# Patient Record
Sex: MALE | Race: WHITE | NOT HISPANIC OR LATINO | Employment: PART TIME | URBAN - METROPOLITAN AREA
[De-identification: names, ages, dates, MRNs, and addresses within clinical notes are randomized per-mention and may not be internally consistent; named-entity substitution may affect disease eponyms.]

---

## 2023-12-03 DIAGNOSIS — E11.59 HYPERTENSION ASSOCIATED WITH TYPE 2 DIABETES MELLITUS (MULTI): ICD-10-CM

## 2023-12-03 DIAGNOSIS — I15.2 HYPERTENSION ASSOCIATED WITH TYPE 2 DIABETES MELLITUS (MULTI): ICD-10-CM

## 2023-12-04 ENCOUNTER — LAB (OUTPATIENT)
Dept: LAB | Facility: LAB | Age: 53
End: 2023-12-04
Payer: MEDICARE

## 2023-12-04 DIAGNOSIS — I25.10 ATHEROSCLEROTIC HEART DISEASE OF NATIVE CORONARY ARTERY WITHOUT ANGINA PECTORIS: ICD-10-CM

## 2023-12-04 DIAGNOSIS — E11.69 TYPE 2 DIABETES MELLITUS WITH OTHER SPECIFIED COMPLICATION (MULTI): Primary | ICD-10-CM

## 2023-12-04 LAB
ALBUMIN SERPL BCP-MCNC: 4.4 G/DL (ref 3.4–5)
ALP SERPL-CCNC: 55 U/L (ref 33–120)
ALT SERPL W P-5'-P-CCNC: 14 U/L (ref 10–52)
ANION GAP SERPL CALC-SCNC: 9 MMOL/L (ref 10–20)
AST SERPL W P-5'-P-CCNC: 12 U/L (ref 9–39)
BILIRUB SERPL-MCNC: 0.4 MG/DL (ref 0–1.2)
BUN SERPL-MCNC: 13 MG/DL (ref 6–23)
CALCIUM SERPL-MCNC: 9.5 MG/DL (ref 8.6–10.3)
CHLORIDE SERPL-SCNC: 102 MMOL/L (ref 98–107)
CHOLEST SERPL-MCNC: 264 MG/DL (ref 0–199)
CHOLESTEROL/HDL RATIO: 5.6
CO2 SERPL-SCNC: 32 MMOL/L (ref 21–32)
CREAT SERPL-MCNC: 0.77 MG/DL (ref 0.5–1.3)
GFR SERPL CREATININE-BSD FRML MDRD: >90 ML/MIN/1.73M*2
GLUCOSE SERPL-MCNC: 91 MG/DL (ref 74–99)
HDLC SERPL-MCNC: 47 MG/DL
LDLC SERPL CALC-MCNC: 191 MG/DL
NON HDL CHOLESTEROL: 217 MG/DL (ref 0–149)
POTASSIUM SERPL-SCNC: 4.1 MMOL/L (ref 3.5–5.3)
PROT SERPL-MCNC: 6.9 G/DL (ref 6.4–8.2)
SODIUM SERPL-SCNC: 139 MMOL/L (ref 136–145)
TRIGL SERPL-MCNC: 129 MG/DL (ref 0–149)
VLDL: 26 MG/DL (ref 0–40)

## 2023-12-04 PROCEDURE — 36415 COLL VENOUS BLD VENIPUNCTURE: CPT

## 2023-12-04 PROCEDURE — 80061 LIPID PANEL: CPT

## 2023-12-04 PROCEDURE — 80053 COMPREHEN METABOLIC PANEL: CPT

## 2023-12-05 PROBLEM — S62.303B: Status: ACTIVE | Noted: 2019-04-12

## 2023-12-05 PROBLEM — F17.200 TOBACCO USE DISORDER: Status: ACTIVE | Noted: 2023-12-05

## 2023-12-05 PROBLEM — S62.91XA: Status: ACTIVE | Noted: 2019-04-07

## 2023-12-05 PROBLEM — L30.9 ECZEMA: Status: ACTIVE | Noted: 2023-12-05
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
## 2023-12-05 PROBLEM — E78.5 HYPERLIPIDEMIA: Status: ACTIVE | Noted: 2023-12-05

## 2023-12-05 PROBLEM — S06.0XAA CONCUSSION: Status: ACTIVE | Noted: 2019-04-10

## 2023-12-05 PROBLEM — S42.252D DISPLACED FRACTURE OF GREATER TUBEROSITY OF LEFT HUMERUS WITH ROUTINE HEALING: Status: ACTIVE | Noted: 2019-05-08

## 2023-12-05 PROBLEM — E11.69 HYPERLIPIDEMIA DUE TO TYPE 2 DIABETES MELLITUS (MULTI): Status: ACTIVE | Noted: 2023-12-05

## 2023-12-05 PROBLEM — F17.200 CURRENT EVERY DAY SMOKER: Status: ACTIVE | Noted: 2023-12-05

## 2023-12-05 PROBLEM — Z91.199 NONCOMPLIANCE: Status: ACTIVE | Noted: 2023-12-05

## 2023-12-05 PROBLEM — E78.00 PURE HYPERCHOLESTEROLEMIA: Status: ACTIVE | Noted: 2023-12-05

## 2023-12-05 PROBLEM — U07.1 DISEASE DUE TO SEVERE ACUTE RESPIRATORY SYNDROME CORONAVIRUS 2 (SARS-COV-2): Status: ACTIVE | Noted: 2023-12-05

## 2023-12-05 PROBLEM — E78.5 HYPERLIPIDEMIA DUE TO TYPE 2 DIABETES MELLITUS (MULTI): Status: ACTIVE | Noted: 2023-12-05

## 2023-12-05 PROBLEM — S62.309D: Status: ACTIVE | Noted: 2019-05-08

## 2023-12-05 PROBLEM — E11.9 DIABETES MELLITUS, TYPE II (MULTI): Status: ACTIVE | Noted: 2023-12-05

## 2023-12-05 PROBLEM — R06.02 SOB (SHORTNESS OF BREATH) ON EXERTION: Status: ACTIVE | Noted: 2023-12-05

## 2023-12-05 PROBLEM — F10.10 ALCOHOL CONSUMPTION BINGE DRINKING: Status: ACTIVE | Noted: 2023-12-05

## 2023-12-05 PROBLEM — T07.XXXA MULTIPLE INJURIES DUE TO TRAUMA: Status: ACTIVE | Noted: 2019-04-07

## 2023-12-05 PROBLEM — L25.9 CONTACT DERMATITIS: Status: ACTIVE | Noted: 2023-12-05

## 2023-12-05 PROBLEM — R07.89 CHEST PRESSURE: Status: ACTIVE | Noted: 2023-12-05

## 2023-12-05 PROBLEM — S52.502A CLOSED FRACTURE OF LEFT DISTAL RADIUS AND ULNA: Status: ACTIVE | Noted: 2023-12-05

## 2023-12-05 PROBLEM — S82.202A CLOSED FRACTURE OF LEFT FIBULA AND TIBIA: Status: ACTIVE | Noted: 2019-04-07

## 2023-12-05 PROBLEM — I25.10 CORONARY ARTERY DISEASE INVOLVING NATIVE CORONARY ARTERY OF NATIVE HEART WITHOUT ANGINA PECTORIS: Status: ACTIVE | Noted: 2023-12-05

## 2023-12-05 PROBLEM — E87.6 HYPOKALEMIA: Status: ACTIVE | Noted: 2019-04-11

## 2023-12-05 PROBLEM — K21.9 GERD (GASTROESOPHAGEAL REFLUX DISEASE): Status: ACTIVE | Noted: 2019-04-10

## 2023-12-05 PROBLEM — G47.30 SLEEP APNEA: Status: ACTIVE | Noted: 2023-12-05

## 2023-12-05 PROBLEM — S52.602A CLOSED FRACTURE OF LEFT DISTAL RADIUS AND ULNA: Status: ACTIVE | Noted: 2023-12-05

## 2023-12-05 PROBLEM — R94.39 ABNORMAL STRESS TEST: Status: ACTIVE | Noted: 2023-12-05

## 2023-12-05 PROBLEM — I15.2 HYPERTENSION ASSOCIATED WITH TYPE 2 DIABETES MELLITUS (MULTI): Status: ACTIVE | Noted: 2023-12-05

## 2023-12-05 PROBLEM — E11.59 HYPERTENSION ASSOCIATED WITH TYPE 2 DIABETES MELLITUS (MULTI): Status: ACTIVE | Noted: 2023-12-05

## 2023-12-05 PROBLEM — S82.402A CLOSED FRACTURE OF LEFT FIBULA AND TIBIA: Status: ACTIVE | Noted: 2019-04-07

## 2023-12-05 RX ORDER — ESOMEPRAZOLE MAGNESIUM 20 MG/1
2 TABLET, DELAYED RELEASE ORAL DAILY
COMMUNITY

## 2023-12-05 RX ORDER — AMLODIPINE BESYLATE 5 MG/1
5 TABLET ORAL DAILY
Qty: 90 TABLET | Refills: 3 | Status: SHIPPED | OUTPATIENT
Start: 2023-12-05 | End: 2023-12-07 | Stop reason: SDUPTHER

## 2023-12-05 RX ORDER — LISINOPRIL 10 MG/1
10 TABLET ORAL DAILY
COMMUNITY

## 2023-12-05 RX ORDER — ASPIRIN 81 MG/1
81 TABLET ORAL DAILY
COMMUNITY

## 2023-12-05 RX ORDER — NITROGLYCERIN 0.4 MG/1
0.4 TABLET SUBLINGUAL EVERY 5 MIN PRN
COMMUNITY
End: 2024-03-04 | Stop reason: SDUPTHER

## 2023-12-05 RX ORDER — AMLODIPINE BESYLATE 5 MG/1
5 TABLET ORAL DAILY
COMMUNITY
End: 2023-12-07 | Stop reason: WASHOUT

## 2023-12-07 ENCOUNTER — OFFICE VISIT (OUTPATIENT)
Dept: CARDIOLOGY | Facility: CLINIC | Age: 53
End: 2023-12-07
Payer: MEDICARE

## 2023-12-07 VITALS
HEIGHT: 64 IN | DIASTOLIC BLOOD PRESSURE: 78 MMHG | WEIGHT: 152 LBS | SYSTOLIC BLOOD PRESSURE: 133 MMHG | BODY MASS INDEX: 25.95 KG/M2 | HEART RATE: 63 BPM

## 2023-12-07 DIAGNOSIS — E11.9 TYPE 2 DIABETES MELLITUS WITHOUT COMPLICATION, WITHOUT LONG-TERM CURRENT USE OF INSULIN (MULTI): ICD-10-CM

## 2023-12-07 DIAGNOSIS — E11.59 HYPERTENSION ASSOCIATED WITH TYPE 2 DIABETES MELLITUS (MULTI): ICD-10-CM

## 2023-12-07 DIAGNOSIS — E11.69 HYPERLIPIDEMIA DUE TO TYPE 2 DIABETES MELLITUS (MULTI): ICD-10-CM

## 2023-12-07 DIAGNOSIS — E78.5 HYPERLIPIDEMIA DUE TO TYPE 2 DIABETES MELLITUS (MULTI): ICD-10-CM

## 2023-12-07 DIAGNOSIS — I25.10 CORONARY ARTERY DISEASE INVOLVING NATIVE CORONARY ARTERY OF NATIVE HEART WITHOUT ANGINA PECTORIS: ICD-10-CM

## 2023-12-07 DIAGNOSIS — F17.200 CURRENT EVERY DAY SMOKER: ICD-10-CM

## 2023-12-07 DIAGNOSIS — I15.2 HYPERTENSION ASSOCIATED WITH TYPE 2 DIABETES MELLITUS (MULTI): ICD-10-CM

## 2023-12-07 DIAGNOSIS — G47.30 SLEEP APNEA, UNSPECIFIED TYPE: ICD-10-CM

## 2023-12-07 DIAGNOSIS — R94.39 ABNORMAL STRESS TEST: ICD-10-CM

## 2023-12-07 DIAGNOSIS — R07.9 CHEST PAIN, UNSPECIFIED TYPE: Primary | ICD-10-CM

## 2023-12-07 PROCEDURE — 99214 OFFICE O/P EST MOD 30 MIN: CPT | Performed by: INTERNAL MEDICINE

## 2023-12-07 PROCEDURE — 3078F DIAST BP <80 MM HG: CPT | Performed by: INTERNAL MEDICINE

## 2023-12-07 PROCEDURE — 4010F ACE/ARB THERAPY RXD/TAKEN: CPT | Performed by: INTERNAL MEDICINE

## 2023-12-07 PROCEDURE — 3075F SYST BP GE 130 - 139MM HG: CPT | Performed by: INTERNAL MEDICINE

## 2023-12-07 PROCEDURE — 3050F LDL-C >= 130 MG/DL: CPT | Performed by: INTERNAL MEDICINE

## 2023-12-07 NOTE — PATIENT INSTRUCTIONS
CARDIAC CT ORDERED, TAKE ATENOLOL 100 MG 2 HOURS PRIOR, OBTAIN LAB WORK PRIOR    START ZETIA 10 MG DAILY  INCREASE AMLODIPINE TO 5 MG TWICE DAILY    WILL ORDER LEQVIO    FOLLOW UP AFTER CT SCAN
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
## 2023-12-08 RX ORDER — ATENOLOL 100 MG/1
TABLET ORAL
Qty: 1 TABLET | Refills: 0 | Status: SHIPPED | OUTPATIENT
Start: 2023-12-08 | End: 2024-01-23 | Stop reason: WASHOUT

## 2023-12-08 RX ORDER — EZETIMIBE 10 MG/1
10 TABLET ORAL DAILY
Qty: 90 TABLET | Refills: 3 | Status: SHIPPED | OUTPATIENT
Start: 2023-12-08 | End: 2024-12-07

## 2023-12-08 RX ORDER — AMLODIPINE BESYLATE 5 MG/1
5 TABLET ORAL 2 TIMES DAILY
Qty: 180 TABLET | Refills: 3 | Status: SHIPPED | OUTPATIENT
Start: 2023-12-08 | End: 2024-12-07

## 2023-12-09 DIAGNOSIS — R07.9 CHEST PAIN, UNSPECIFIED TYPE: ICD-10-CM

## 2023-12-12 RX ORDER — ATENOLOL 100 MG/1
TABLET ORAL
Qty: 1 TABLET | Refills: 0 | OUTPATIENT
Start: 2023-12-12

## 2023-12-12 NOTE — TELEPHONE ENCOUNTER
SCHEDULED CCTA AT [facility] ON 1/5/24 AT 9:25AM ARRIVAL TIME, I LEFT VOICEMAIL WITH APPT INFO & SCHEDULING#

## 2024-01-05 ENCOUNTER — ANCILLARY PROCEDURE (OUTPATIENT)
Dept: RADIOLOGY | Facility: CLINIC | Age: 54
End: 2024-01-05
Payer: MEDICARE

## 2024-01-05 VITALS
BODY MASS INDEX: 25.93 KG/M2 | HEIGHT: 64 IN | DIASTOLIC BLOOD PRESSURE: 67 MMHG | RESPIRATION RATE: 16 BRPM | SYSTOLIC BLOOD PRESSURE: 105 MMHG | HEART RATE: 61 BPM | OXYGEN SATURATION: 94 % | WEIGHT: 151.9 LBS

## 2024-01-05 DIAGNOSIS — R94.39 ABNORMAL STRESS TEST: ICD-10-CM

## 2024-01-05 DIAGNOSIS — R93.1 ABNORMAL FINDINGS ON DIAGNOSTIC IMAGING OF HEART AND CORONARY CIRCULATION: ICD-10-CM

## 2024-01-05 DIAGNOSIS — R07.9 CHEST PAIN, UNSPECIFIED TYPE: ICD-10-CM

## 2024-01-05 PROCEDURE — 2500000001 HC RX 250 WO HCPCS SELF ADMINISTERED DRUGS (ALT 637 FOR MEDICARE OP): Performed by: INTERNAL MEDICINE

## 2024-01-05 PROCEDURE — 75580 N-INVAS EST C FFR SW ALY CTA: CPT

## 2024-01-05 PROCEDURE — 75574 CT ANGIO HRT W/3D IMAGE: CPT

## 2024-01-05 PROCEDURE — 75574 CT ANGIO HRT W/3D IMAGE: CPT | Performed by: INTERNAL MEDICINE

## 2024-01-05 PROCEDURE — 2550000001 HC RX 255 CONTRASTS: Performed by: INTERNAL MEDICINE

## 2024-01-05 PROCEDURE — 75580 N-INVAS EST C FFR SW ALY CTA: CPT | Performed by: INTERNAL MEDICINE

## 2024-01-05 RX ORDER — NITROGLYCERIN 400 UG/1
2 SPRAY ORAL ONCE
Status: COMPLETED | OUTPATIENT
Start: 2024-01-05 | End: 2024-01-05

## 2024-01-05 RX ADMIN — IOHEXOL 80 ML: 350 INJECTION, SOLUTION INTRAVENOUS at 10:37

## 2024-01-05 RX ADMIN — NITROGLYCERIN 2 SPRAY: 400 SPRAY ORAL at 09:59

## 2024-01-05 NOTE — NURSING NOTE
Post CCTA pt denies feeling dizzy or lightheaded, denies headache. Steady on feet, skin warm pink and dry. Pt verbalized understanding of increased water intake x24 hours, educated on side effects of medications. HL removed with tip intact, manual pressure held until hemostasis achieved, 2x2 and coban to site. Pt DC home to self care with wife.

## 2024-01-18 ENCOUNTER — TELEPHONE (OUTPATIENT)
Dept: CARDIOLOGY | Facility: CLINIC | Age: 54
End: 2024-01-18
Payer: MEDICARE

## 2024-01-18 NOTE — TELEPHONE ENCOUNTER
Per Dr. Arauz, DO he wants to see the patient in the office to review results.     Will forward to secretaries.

## 2024-01-18 NOTE — TELEPHONE ENCOUNTER
----- Message from Leland Arauz DO sent at 1/8/2024  8:58 AM EST -----  Abnormal. Need to see him this week to discuss cardiac cath  ----- Message -----  From: Interface, Radiology Results In  Sent: 1/5/2024   5:51 PM EST  To: Leland Arauz DO

## 2024-01-18 NOTE — TELEPHONE ENCOUNTER
Patient completed CCTA on 1/5/24.   Abnormal finding.     Per Dr. Arauz, DO patient needs OV to discuss LHC and abnormal CCTA.   Will route to secretaries.

## 2024-01-30 ENCOUNTER — TELEPHONE (OUTPATIENT)
Dept: CARDIOLOGY | Facility: CLINIC | Age: 54
End: 2024-01-30
Payer: MEDICARE

## 2024-01-30 NOTE — TELEPHONE ENCOUNTER
Left voicemail advising patient that we canceled his appointment on 2/14 and rescheduled him for 2/22 at 9am. Mailed appointment reminder.

## 2024-02-14 ENCOUNTER — APPOINTMENT (OUTPATIENT)
Dept: CARDIOLOGY | Facility: CLINIC | Age: 54
End: 2024-02-14
Payer: MEDICARE

## 2024-02-22 ENCOUNTER — LAB (OUTPATIENT)
Dept: LAB | Facility: LAB | Age: 54
End: 2024-02-22
Payer: MEDICARE

## 2024-02-22 ENCOUNTER — OFFICE VISIT (OUTPATIENT)
Dept: CARDIOLOGY | Facility: CLINIC | Age: 54
End: 2024-02-22
Payer: MEDICARE

## 2024-02-22 VITALS
HEIGHT: 64 IN | HEART RATE: 71 BPM | BODY MASS INDEX: 27.4 KG/M2 | WEIGHT: 160.5 LBS | SYSTOLIC BLOOD PRESSURE: 122 MMHG | DIASTOLIC BLOOD PRESSURE: 76 MMHG

## 2024-02-22 DIAGNOSIS — Z01.810 PRE-PROCEDURAL CARDIOVASCULAR EXAMINATION: ICD-10-CM

## 2024-02-22 DIAGNOSIS — E11.59 HYPERTENSION ASSOCIATED WITH TYPE 2 DIABETES MELLITUS (MULTI): ICD-10-CM

## 2024-02-22 DIAGNOSIS — R93.1 ABNORMAL CARDIAC CT ANGIOGRAPHY: ICD-10-CM

## 2024-02-22 DIAGNOSIS — R07.9 CHEST PAIN, UNSPECIFIED TYPE: ICD-10-CM

## 2024-02-22 DIAGNOSIS — I15.2 HYPERTENSION ASSOCIATED WITH TYPE 2 DIABETES MELLITUS (MULTI): ICD-10-CM

## 2024-02-22 LAB
ANION GAP SERPL CALC-SCNC: 13 MMOL/L (ref 10–20)
BUN SERPL-MCNC: 14 MG/DL (ref 6–23)
CALCIUM SERPL-MCNC: 10 MG/DL (ref 8.6–10.3)
CHLORIDE SERPL-SCNC: 101 MMOL/L (ref 98–107)
CO2 SERPL-SCNC: 30 MMOL/L (ref 21–32)
CREAT SERPL-MCNC: 0.84 MG/DL (ref 0.5–1.3)
EGFRCR SERPLBLD CKD-EPI 2021: >90 ML/MIN/1.73M*2
ERYTHROCYTE [DISTWIDTH] IN BLOOD BY AUTOMATED COUNT: 12.5 % (ref 11.5–14.5)
GLUCOSE SERPL-MCNC: 156 MG/DL (ref 74–99)
HCT VFR BLD AUTO: 49 % (ref 41–52)
HGB BLD-MCNC: 16.6 G/DL (ref 13.5–17.5)
MCH RBC QN AUTO: 30.3 PG (ref 26–34)
MCHC RBC AUTO-ENTMCNC: 33.9 G/DL (ref 32–36)
MCV RBC AUTO: 90 FL (ref 80–100)
NRBC BLD-RTO: 0 /100 WBCS (ref 0–0)
PLATELET # BLD AUTO: 200 X10*3/UL (ref 150–450)
POTASSIUM SERPL-SCNC: 4.8 MMOL/L (ref 3.5–5.3)
RBC # BLD AUTO: 5.47 X10*6/UL (ref 4.5–5.9)
SODIUM SERPL-SCNC: 139 MMOL/L (ref 136–145)
WBC # BLD AUTO: 8.8 X10*3/UL (ref 4.4–11.3)

## 2024-02-22 PROCEDURE — 80048 BASIC METABOLIC PNL TOTAL CA: CPT

## 2024-02-22 PROCEDURE — 3078F DIAST BP <80 MM HG: CPT | Performed by: INTERNAL MEDICINE

## 2024-02-22 PROCEDURE — 36415 COLL VENOUS BLD VENIPUNCTURE: CPT

## 2024-02-22 PROCEDURE — 99215 OFFICE O/P EST HI 40 MIN: CPT | Performed by: INTERNAL MEDICINE

## 2024-02-22 PROCEDURE — 85027 COMPLETE CBC AUTOMATED: CPT

## 2024-02-22 PROCEDURE — 3074F SYST BP LT 130 MM HG: CPT | Performed by: INTERNAL MEDICINE

## 2024-02-22 PROCEDURE — 4010F ACE/ARB THERAPY RXD/TAKEN: CPT | Performed by: INTERNAL MEDICINE

## 2024-02-22 RX ORDER — METOPROLOL SUCCINATE 25 MG/1
25 TABLET, EXTENDED RELEASE ORAL DAILY
Qty: 90 TABLET | Refills: 3 | Status: SHIPPED | OUTPATIENT
Start: 2024-02-22 | End: 2025-02-21

## 2024-02-22 RX ORDER — ASPIRIN 325 MG
325 TABLET ORAL ONCE
Status: CANCELLED | OUTPATIENT
Start: 2024-02-22 | End: 2024-02-22
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
## 2024-02-23 ENCOUNTER — HOSPITAL ENCOUNTER (OUTPATIENT)
Facility: HOSPITAL | Age: 54
Setting detail: OUTPATIENT SURGERY
Discharge: HOME | End: 2024-02-23
Attending: INTERNAL MEDICINE | Admitting: INTERNAL MEDICINE
Payer: MEDICARE

## 2024-02-23 ENCOUNTER — APPOINTMENT (OUTPATIENT)
Dept: CARDIOLOGY | Facility: HOSPITAL | Age: 54
End: 2024-02-23
Payer: MEDICARE

## 2024-02-23 VITALS
RESPIRATION RATE: 18 BRPM | DIASTOLIC BLOOD PRESSURE: 60 MMHG | HEART RATE: 68 BPM | SYSTOLIC BLOOD PRESSURE: 118 MMHG | OXYGEN SATURATION: 95 % | TEMPERATURE: 36.1 F

## 2024-02-23 DIAGNOSIS — R93.1 ABNORMAL CARDIAC CT ANGIOGRAPHY: Primary | ICD-10-CM

## 2024-02-23 PROCEDURE — 93458 L HRT ARTERY/VENTRICLE ANGIO: CPT | Performed by: INTERNAL MEDICINE

## 2024-02-23 PROCEDURE — 99152 MOD SED SAME PHYS/QHP 5/>YRS: CPT | Performed by: INTERNAL MEDICINE

## 2024-02-23 PROCEDURE — 2720000007 HC OR 272 NO HCPCS: Performed by: INTERNAL MEDICINE

## 2024-02-23 PROCEDURE — 7100000010 HC PHASE TWO TIME - EACH INCREMENTAL 1 MINUTE: Performed by: INTERNAL MEDICINE

## 2024-02-23 PROCEDURE — 2550000001 HC RX 255 CONTRASTS: Performed by: INTERNAL MEDICINE

## 2024-02-23 PROCEDURE — 93010 ELECTROCARDIOGRAM REPORT: CPT | Performed by: INTERNAL MEDICINE

## 2024-02-23 PROCEDURE — 2500000005 HC RX 250 GENERAL PHARMACY W/O HCPCS: Performed by: INTERNAL MEDICINE

## 2024-02-23 PROCEDURE — 2500000001 HC RX 250 WO HCPCS SELF ADMINISTERED DRUGS (ALT 637 FOR MEDICARE OP): Performed by: INTERNAL MEDICINE

## 2024-02-23 PROCEDURE — 7100000009 HC PHASE TWO TIME - INITIAL BASE CHARGE: Performed by: INTERNAL MEDICINE

## 2024-02-23 PROCEDURE — 2500000004 HC RX 250 GENERAL PHARMACY W/ HCPCS (ALT 636 FOR OP/ED): Performed by: INTERNAL MEDICINE

## 2024-02-23 PROCEDURE — C1894 INTRO/SHEATH, NON-LASER: HCPCS | Performed by: INTERNAL MEDICINE

## 2024-02-23 PROCEDURE — 2500000004 HC RX 250 GENERAL PHARMACY W/ HCPCS (ALT 636 FOR OP/ED): Performed by: NURSE PRACTITIONER

## 2024-02-23 PROCEDURE — 2500000001 HC RX 250 WO HCPCS SELF ADMINISTERED DRUGS (ALT 637 FOR MEDICARE OP): Performed by: NURSE PRACTITIONER

## 2024-02-23 PROCEDURE — 93005 ELECTROCARDIOGRAM TRACING: CPT

## 2024-02-23 PROCEDURE — C1887 CATHETER, GUIDING: HCPCS | Performed by: INTERNAL MEDICINE

## 2024-02-23 RX ORDER — MIDAZOLAM HYDROCHLORIDE 1 MG/ML
INJECTION INTRAMUSCULAR; INTRAVENOUS AS NEEDED
Status: DISCONTINUED | OUTPATIENT
Start: 2024-02-23 | End: 2024-02-23 | Stop reason: HOSPADM

## 2024-02-23 RX ORDER — HEPARIN SODIUM 1000 [USP'U]/ML
INJECTION, SOLUTION INTRAVENOUS; SUBCUTANEOUS AS NEEDED
Status: DISCONTINUED | OUTPATIENT
Start: 2024-02-23 | End: 2024-02-23 | Stop reason: HOSPADM

## 2024-02-23 RX ORDER — SODIUM CHLORIDE 9 MG/ML
100 INJECTION, SOLUTION INTRAVENOUS CONTINUOUS
Status: DISCONTINUED | OUTPATIENT
Start: 2024-02-23 | End: 2024-02-23 | Stop reason: HOSPADM

## 2024-02-23 RX ORDER — SODIUM CHLORIDE 9 MG/ML
100 INJECTION, SOLUTION INTRAVENOUS CONTINUOUS
Status: DISCONTINUED | OUTPATIENT
Start: 2024-02-23 | End: 2024-02-23

## 2024-02-23 RX ORDER — FENTANYL CITRATE 50 UG/ML
INJECTION, SOLUTION INTRAMUSCULAR; INTRAVENOUS AS NEEDED
Status: DISCONTINUED | OUTPATIENT
Start: 2024-02-23 | End: 2024-02-23 | Stop reason: HOSPADM

## 2024-02-23 RX ORDER — DIPHENHYDRAMINE HCL 25 MG
25 TABLET ORAL ONCE
Status: COMPLETED | OUTPATIENT
Start: 2024-02-23 | End: 2024-02-23

## 2024-02-23 RX ORDER — LIDOCAINE HYDROCHLORIDE 20 MG/ML
INJECTION, SOLUTION INFILTRATION; PERINEURAL AS NEEDED
Status: DISCONTINUED | OUTPATIENT
Start: 2024-02-23 | End: 2024-02-23 | Stop reason: HOSPADM

## 2024-02-23 RX ORDER — METOPROLOL SUCCINATE 25 MG/1
25 TABLET, EXTENDED RELEASE ORAL ONCE
Status: COMPLETED | OUTPATIENT
Start: 2024-02-23 | End: 2024-02-23

## 2024-02-23 RX ADMIN — SODIUM CHLORIDE 100 ML/HR: 9 INJECTION, SOLUTION INTRAVENOUS at 08:12

## 2024-02-23 RX ADMIN — DIPHENHYDRAMINE HYDROCHLORIDE 25 MG: 25 TABLET ORAL at 08:06

## 2024-02-23 RX ADMIN — METOPROLOL SUCCINATE 25 MG: 25 TABLET, EXTENDED RELEASE ORAL at 08:10

## 2024-02-23 RX ADMIN — METHYLPREDNISOLONE SODIUM SUCCINATE 125 MG: 125 INJECTION, POWDER, FOR SOLUTION INTRAMUSCULAR; INTRAVENOUS at 08:06

## 2024-02-23 ASSESSMENT — COLUMBIA-SUICIDE SEVERITY RATING SCALE - C-SSRS
6. HAVE YOU EVER DONE ANYTHING, STARTED TO DO ANYTHING, OR PREPARED TO DO ANYTHING TO END YOUR LIFE?: NO
2. HAVE YOU ACTUALLY HAD ANY THOUGHTS OF KILLING YOURSELF?: NO
1. IN THE PAST MONTH, HAVE YOU WISHED YOU WERE DEAD OR WISHED YOU COULD GO TO SLEEP AND NOT WAKE UP?: NO

## 2024-02-23 NOTE — POST-PROCEDURE NOTE
Physician Transition of Care Summary  Invasive Cardiovascular Lab    Procedure Date: 2/23/2024  Attending:    * Valarie Camacho - Primary  Resident/Fellow/Other Assistant: Surgeon(s) and Role:    Indications:   Pre-op Diagnosis     * Abnormal cardiac CT angiography [R93.1]    Post-procedure diagnosis:   Post-op Diagnosis     * Abnormal cardiac CT angiography [R93.1]    Procedure(s):   Left Heart Cath  90499 - [place] CATH PLMT L HRT & ARTS W/NJX & ANGIO IMG S&I        Procedure Findings:   50% stenosis of proximal and distal right coronary artery, mild disease of proximal LAD, normal circumflex, normal left ventricular function    Description of the Procedure:   Left heart catheterization coronary angiography left angiogram    Complications:   None    Stents/Implants:       Anticoagulation/Antiplatelet Plan:   Intravenous heparin    Estimated Blood Loss:   * No values recorded between 2/23/2024  9:43 AM and 2/23/2024 10:09 AM *    Anesthesia: Moderate Sedation Anesthesia Staff: No anesthesia staff entered.    Any Specimen(s) Removed:   No specimens collected during this procedure.    Disposition:   Medical therapy      Electronically signed by: Valarie Camacho MD, 2/23/2024 10:09 AM

## 2024-02-23 NOTE — PROGRESS NOTES
Reviewed findings of cardiac catheterization, pictures of coronary arteries, postprocedure activity restrictions, need for medical management of coronary artery disease and coronary artery risk factor modification with patient and his wife was at the bedside.  Pictures of coronary arteries were reviewed and provided to them.  Long discussion on importance of aggressive risk factor modification including medication compliance and smoking cessation as well as to follow-up with Dr. Arauz for further optimization of medical therapy.  They verbalized understanding of this information.

## 2024-02-26 ENCOUNTER — TELEPHONE (OUTPATIENT)
Dept: CARDIOLOGY | Facility: CLINIC | Age: 54
End: 2024-02-26
Payer: MEDICARE

## 2024-02-26 NOTE — TELEPHONE ENCOUNTER
Patient called and stated pharmacy said they did not have prescriptions. Records are currently showing that patient has 3 refills left of ezetimibe, amlodipine, and metoprolol at [facility]. Patient will call pharmacy and call us back if there is an issue.

## 2024-02-26 NOTE — TELEPHONE ENCOUNTER
Patient called back stating the pharmacy still didn't have record of the ezetimibe. I personally called the pharmacy with the same result. I verbally called in the ezetimibe 10mg daily.

## 2024-02-27 LAB
ATRIAL RATE: 71 BPM
P AXIS: 59 DEGREES
P OFFSET: 205 MS
P ONSET: 154 MS
PR INTERVAL: 138 MS
Q ONSET: 223 MS
QRS COUNT: 11 BEATS
QRS DURATION: 92 MS
QT INTERVAL: 342 MS
QTC CALCULATION(BAZETT): 371 MS
QTC FREDERICIA: 361 MS
R AXIS: 64 DEGREES
T AXIS: 27 DEGREES
T OFFSET: 394 MS
VENTRICULAR RATE: 71 BPM

## 2024-03-04 ENCOUNTER — OFFICE VISIT (OUTPATIENT)
Dept: CARDIOLOGY | Facility: CLINIC | Age: 54
End: 2024-03-04
Payer: MEDICARE

## 2024-03-04 VITALS
BODY MASS INDEX: 27.6 KG/M2 | SYSTOLIC BLOOD PRESSURE: 118 MMHG | HEART RATE: 76 BPM | DIASTOLIC BLOOD PRESSURE: 58 MMHG | WEIGHT: 160.8 LBS

## 2024-03-04 DIAGNOSIS — E78.5 HYPERLIPIDEMIA DUE TO TYPE 2 DIABETES MELLITUS (MULTI): ICD-10-CM

## 2024-03-04 DIAGNOSIS — I25.10 CORONARY ARTERY DISEASE INVOLVING NATIVE CORONARY ARTERY OF NATIVE HEART WITHOUT ANGINA PECTORIS: Chronic | ICD-10-CM

## 2024-03-04 DIAGNOSIS — E11.69 HYPERLIPIDEMIA DUE TO TYPE 2 DIABETES MELLITUS (MULTI): ICD-10-CM

## 2024-03-04 DIAGNOSIS — E11.59 HYPERTENSION ASSOCIATED WITH TYPE 2 DIABETES MELLITUS (MULTI): ICD-10-CM

## 2024-03-04 DIAGNOSIS — E11.9 TYPE 2 DIABETES MELLITUS WITHOUT COMPLICATION, WITHOUT LONG-TERM CURRENT USE OF INSULIN (MULTI): ICD-10-CM

## 2024-03-04 DIAGNOSIS — I15.2 HYPERTENSION ASSOCIATED WITH TYPE 2 DIABETES MELLITUS (MULTI): ICD-10-CM

## 2024-03-04 DIAGNOSIS — F17.200 CURRENT EVERY DAY SMOKER: ICD-10-CM

## 2024-03-04 PROCEDURE — 4010F ACE/ARB THERAPY RXD/TAKEN: CPT | Performed by: INTERNAL MEDICINE

## 2024-03-04 PROCEDURE — 3074F SYST BP LT 130 MM HG: CPT | Performed by: INTERNAL MEDICINE

## 2024-03-04 PROCEDURE — 3078F DIAST BP <80 MM HG: CPT | Performed by: INTERNAL MEDICINE

## 2024-03-04 PROCEDURE — 3008F BODY MASS INDEX DOCD: CPT | Performed by: INTERNAL MEDICINE

## 2024-03-04 PROCEDURE — 99214 OFFICE O/P EST MOD 30 MIN: CPT | Performed by: INTERNAL MEDICINE

## 2024-03-04 RX ORDER — NITROGLYCERIN 0.4 MG/1
0.4 TABLET SUBLINGUAL EVERY 5 MIN PRN
Qty: 25 TABLET | Refills: 5 | Status: SHIPPED | OUTPATIENT
Start: 2024-03-04

## 2024-03-04 NOTE — PATIENT INSTRUCTIONS
1 year follow up  Will obtain CMP and lipid prior to apt  Continue same medications and treatments.   Patient educated on proper medication use.   Patient educated on risk factor modification.   Please bring any lab results from other providers / physicians to your next appointment.     Please bring all medicines, vitamins, and herbal supplements with you when you come to the office.     Prescriptions will not be filled unless you are compliant with your follow up appointments or have a follow up appointment scheduled as per instruction of your physician. Refills should be requested at the time of your visit.   Yuni MEDINA LPN, am scribing for and in the presence of Dr. Leland Arauz, DO
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
## 2024-06-12 DIAGNOSIS — E11.59 HYPERTENSION ASSOCIATED WITH TYPE 2 DIABETES MELLITUS (MULTI): ICD-10-CM

## 2024-06-12 DIAGNOSIS — I15.2 HYPERTENSION ASSOCIATED WITH TYPE 2 DIABETES MELLITUS (MULTI): ICD-10-CM

## 2024-06-12 RX ORDER — LISINOPRIL 10 MG/1
10 TABLET ORAL DAILY
Qty: 90 TABLET | Refills: 3 | Status: SHIPPED | OUTPATIENT
Start: 2024-06-12

## 2024-07-29 ENCOUNTER — APPOINTMENT (OUTPATIENT)
Dept: PRIMARY CARE | Facility: CLINIC | Age: 54
End: 2024-07-29
Payer: MEDICARE

## 2024-07-29 VITALS
DIASTOLIC BLOOD PRESSURE: 84 MMHG | TEMPERATURE: 98.1 F | BODY MASS INDEX: 26.7 KG/M2 | HEIGHT: 64 IN | SYSTOLIC BLOOD PRESSURE: 138 MMHG | HEART RATE: 62 BPM | WEIGHT: 156.4 LBS | OXYGEN SATURATION: 97 %

## 2024-07-29 DIAGNOSIS — M54.41 ACUTE BILATERAL LOW BACK PAIN WITH BILATERAL SCIATICA: Primary | ICD-10-CM

## 2024-07-29 DIAGNOSIS — M54.16 LUMBAR RADICULAR PAIN: ICD-10-CM

## 2024-07-29 DIAGNOSIS — M54.42 ACUTE BILATERAL LOW BACK PAIN WITH BILATERAL SCIATICA: Primary | ICD-10-CM

## 2024-07-29 PROCEDURE — 99214 OFFICE O/P EST MOD 30 MIN: CPT | Performed by: INTERNAL MEDICINE

## 2024-07-29 PROCEDURE — 3079F DIAST BP 80-89 MM HG: CPT | Performed by: INTERNAL MEDICINE

## 2024-07-29 PROCEDURE — 4010F ACE/ARB THERAPY RXD/TAKEN: CPT | Performed by: INTERNAL MEDICINE

## 2024-07-29 PROCEDURE — 4004F PT TOBACCO SCREEN RCVD TLK: CPT | Performed by: INTERNAL MEDICINE

## 2024-07-29 PROCEDURE — 3075F SYST BP GE 130 - 139MM HG: CPT | Performed by: INTERNAL MEDICINE

## 2024-07-29 PROCEDURE — 3008F BODY MASS INDEX DOCD: CPT | Performed by: INTERNAL MEDICINE

## 2024-07-29 ASSESSMENT — PATIENT HEALTH QUESTIONNAIRE - PHQ9
1. LITTLE INTEREST OR PLEASURE IN DOING THINGS: NOT AT ALL
2. FEELING DOWN, DEPRESSED OR HOPELESS: NOT AT ALL
SUM OF ALL RESPONSES TO PHQ9 QUESTIONS 1 AND 2: 0

## 2024-07-29 ASSESSMENT — ENCOUNTER SYMPTOMS
ABDOMINAL PAIN: 0
ACTIVITY CHANGE: 0
DEPRESSION: 0
COUGH: 0
DYSURIA: 0
LIGHT-HEADEDNESS: 0
BACK PAIN: 1
MYALGIAS: 0
SORE THROAT: 0

## 2024-08-16 ENCOUNTER — APPOINTMENT (OUTPATIENT)
Dept: ORTHOPEDIC SURGERY | Facility: CLINIC | Age: 54
End: 2024-08-16
Payer: MEDICARE

## 2024-08-20 ENCOUNTER — OFFICE VISIT (OUTPATIENT)
Dept: ORTHOPEDIC SURGERY | Facility: CLINIC | Age: 54
End: 2024-08-20
Payer: MEDICARE

## 2024-08-20 ENCOUNTER — HOSPITAL ENCOUNTER (OUTPATIENT)
Dept: RADIOLOGY | Facility: CLINIC | Age: 54
Discharge: HOME | End: 2024-08-20
Payer: MEDICARE

## 2024-08-20 DIAGNOSIS — M54.16 LUMBAR RADICULOPATHY: ICD-10-CM

## 2024-08-20 DIAGNOSIS — M54.42 ACUTE BILATERAL LOW BACK PAIN WITH BILATERAL SCIATICA: ICD-10-CM

## 2024-08-20 DIAGNOSIS — M54.41 ACUTE BILATERAL LOW BACK PAIN WITH BILATERAL SCIATICA: ICD-10-CM

## 2024-08-20 DIAGNOSIS — M54.42 ACUTE BILATERAL LOW BACK PAIN WITH BILATERAL SCIATICA: Primary | ICD-10-CM

## 2024-08-20 DIAGNOSIS — M51.36 DDD (DEGENERATIVE DISC DISEASE), LUMBAR: ICD-10-CM

## 2024-08-20 DIAGNOSIS — M54.41 ACUTE BILATERAL LOW BACK PAIN WITH BILATERAL SCIATICA: Primary | ICD-10-CM

## 2024-08-20 PROCEDURE — 72110 X-RAY EXAM L-2 SPINE 4/>VWS: CPT | Performed by: ORTHOPAEDIC SURGERY

## 2024-08-20 PROCEDURE — 72120 X-RAY BEND ONLY L-S SPINE: CPT

## 2024-08-20 PROCEDURE — 99214 OFFICE O/P EST MOD 30 MIN: CPT | Performed by: ORTHOPAEDIC SURGERY

## 2024-08-20 PROCEDURE — 99204 OFFICE O/P NEW MOD 45 MIN: CPT | Performed by: ORTHOPAEDIC SURGERY

## 2024-08-20 PROCEDURE — 4010F ACE/ARB THERAPY RXD/TAKEN: CPT | Performed by: ORTHOPAEDIC SURGERY

## 2024-08-20 NOTE — PROGRESS NOTES
Damián Gutiérrez is a 54 y.o. male who presents for new patient evaluation of low back pain. Sent by Dr. Lin.     HPI:  54-year-old gentleman here for new patient evaluation of low back pain.  He denies any fever chills nausea vomiting night sweats.  He has no bowel or bladder complaints.    Physical exam:  Well-nourished, well kept.No lymphangitis or lymphadenopathy in the examined extremities.  Gait normal.  Can stand on heels and toes.   Examination of the back shows tenderness in the paraspinous musculature.  There is mildly decreased range of motion in all directions due to guarding/muscle spasms and pain at extremes.  There is good strength and no instability.  Examination of the lower extremities reveals no point tenderness, swelling, or deformity.  Range of motion of the hips, knees, and ankles are full without crepitance, instability, or exacerbation of pain.  Strength is 5/5 throughout except knee flexion and extension is about 4+/5 on the right compared to the left.  No redness, abrasions, or lesions on extremities  Gross sensation intact in the extremities.  Deep tendon reflexes 1+ left patella, absent right patella.  Clonus negative.  Affect normal.  Alert and oriented ×3.  Coordination normal.    Imaging studies:  AP lateral flexion-extension plain films of the lumbar spine were ordered and reviewed today.    Assessment:  54-year-old gentleman here for evaluation of low back and mostly right leg pain.  He was sent by Dr. Lin. He had 2 prior surgeries with Dr. Valerio, he thinks maybe back in 2009 and 2010.  The first procedure was a simple laminectomy and microdisc, the second procedure looks like a intraspinous Coflex type device at L4-5 S1.  He was having difficulty walking, the second procedure helped him be able to walk.  His back pain has started to come back recently, but he is getting more frequent numbness tingling and weakness down his right leg.  Overall he thinks his leg is 80 to 90%  versus 10 to 20% back pain.  No history of recent physical therapy or chiropractic care.  He is also starting to notice that when he stands and tries to walk he is weak in his right leg.  He is noticing occasional left leg symptoms.  He has right knee flexion and extension weakness, about 4+/5.  He has an absent right patellar reflex.  His x-rays show mild to moderate degenerative changes throughout the lumbar spine.    We have ordered and reviewed test today x-rays.  The note from Dr. Lin from July 29, 2024 was reviewed.  It does mention his back pain.  This is an exacerbation and progression of symptoms of a chronic problem that is severely affecting his bodily function.    For complete plan and/or surgical details, please refer to Dr. Mclaughlin's portion of this split dictation.    -Dameon Woodson PA-C    In a face-to-face encounter, I performed a history and physical examination, discussed pertinent diagnostic studies if indicated, and discussed diagnosis and management strategies with both the patient and the midlevel provider.  I reviewed the midlevel's note and agree with the documented findings and plan of care.    Patient with right leg radiculopathy.  He had a midline laminectomy I think at L5-S1 and then a interspinous process spacer placed at L4-5.  He now has severe right leg radiculopathy causing severe and additional bodily function.  We are going to get him into physical therapy and get an MRI of his lumbar spine.  We have ordered and reviewed x-rays today.  We will see him back after the MRI and have a better picture as to what is going on with regards to his right leg radiculopathy.    Philip Mclaughlin MD  Orthopedic surgery

## 2024-08-27 ENCOUNTER — HOSPITAL ENCOUNTER (OUTPATIENT)
Dept: RADIOLOGY | Facility: HOSPITAL | Age: 54
Discharge: HOME | End: 2024-08-27
Payer: MEDICARE

## 2024-08-27 DIAGNOSIS — M51.36 DDD (DEGENERATIVE DISC DISEASE), LUMBAR: ICD-10-CM

## 2024-08-27 DIAGNOSIS — M54.42 ACUTE BILATERAL LOW BACK PAIN WITH BILATERAL SCIATICA: ICD-10-CM

## 2024-08-27 DIAGNOSIS — M54.41 ACUTE BILATERAL LOW BACK PAIN WITH BILATERAL SCIATICA: ICD-10-CM

## 2024-08-27 DIAGNOSIS — M54.16 LUMBAR RADICULOPATHY: ICD-10-CM

## 2024-08-27 PROCEDURE — 72158 MRI LUMBAR SPINE W/O & W/DYE: CPT

## 2024-08-27 PROCEDURE — 2550000001 HC RX 255 CONTRASTS: Performed by: ORTHOPAEDIC SURGERY

## 2024-08-27 PROCEDURE — 72158 MRI LUMBAR SPINE W/O & W/DYE: CPT | Performed by: RADIOLOGY

## 2024-08-27 PROCEDURE — A9575 INJ GADOTERATE MEGLUMI 0.1ML: HCPCS | Performed by: ORTHOPAEDIC SURGERY

## 2024-08-27 RX ORDER — GADOTERATE MEGLUMINE 376.9 MG/ML
0.2 INJECTION INTRAVENOUS
Status: COMPLETED | OUTPATIENT
Start: 2024-08-27 | End: 2024-08-27

## 2024-08-29 ENCOUNTER — OFFICE VISIT (OUTPATIENT)
Dept: ORTHOPEDIC SURGERY | Facility: CLINIC | Age: 54
End: 2024-08-29
Payer: MEDICARE

## 2024-08-29 DIAGNOSIS — M54.16 LUMBAR RADICULOPATHY: Primary | ICD-10-CM

## 2024-08-29 PROCEDURE — 99215 OFFICE O/P EST HI 40 MIN: CPT | Performed by: ORTHOPAEDIC SURGERY

## 2024-08-29 NOTE — PROGRESS NOTES
Damián Gutiérrez is a 54 y.o. male who presents for Follow-up of the Lower Back (MRI Review ).    HPI:  54-year-old gentleman here for follow-up of low back MRI results.  He denies any fever chills nausea vomiting night sweats.  He has no bowel or bladder complaints.    Physical exam:  Well-nourished, well kept.No lymphangitis or lymphadenopathy in the examined extremities.  Gait normal.  Can stand on heels and toes, with just a bit of difficulty.   Examination of the back shows tenderness in the paraspinous musculature in the lumbosacral area a little more so on the right.  There is mildly decreased range of motion in all directions due to guarding/muscle spasms and pain at extremes.  There is good strength and no instability.  Examination of the lower extremities reveals no point tenderness, swelling, or deformity.  Range of motion of the hips, knees, and ankles are full without crepitance, instability, or exacerbation of pain.  Strength is 5/5 throughout, except right knee flexion extension is about 4+/5 compared to the left.  No redness, abrasions, or lesions on extremities  Gross sensation intact in the extremities.  Affect normal.  Alert and oriented ×3.  Coordination normal.    Imaging studies:  An MRI of the lumbar spine from August 27, 2024    Assessment:  54-year-old gentleman here for follow-up of low back MRI results.  He has low back pain a little bit more on the right, and he has severe right leg radiculopathy going down to the back of his calf and into his foot.  He is now starting to have left leg radicular symptoms as well.  Overall the legs bother him worse.  Had prior surgeries with Dr. Valerio in 2009 which was a laminectomy and a microdiscectomy, the second procedure in 2010 was in intraspinous process device at L4-5.  His new MRI shows severe central stenosis at L3-4, it looks like he also may have severe facet arthrosis and some bilateral foraminal narrowing greater on the right at L3-4 as  well.  He has done physical therapy, but it has just made him worse.  This is significantly affecting his bodily function, and he would consider surgery if needed.    We have reviewed tests today, MRI.  This is a patient with an exacerbation of a chronic problem that is severely inhibiting his bodily function.  We did consider and discuss surgery today.    For complete plan and/or surgical details, please refer to Dr. Mclaughlin's portion of this split dictation.    -Dameon Woodson PA-C    In a face-to-face encounter, I performed a history and physical examination, discussed pertinent diagnostic studies if indicated, and discussed diagnosis and management strategies with both the patient and the midlevel provider.  I reviewed the midlevel's note and agree with the documented findings and plan of care.    Patient here for a follow-up of his MRI.  He is having severe right leg radiculopathy and difficulty working and functioning in life.  He is severely inhibited with bodily function.  He does have some back pain as well.  There is no fevers chills nausea vomiting.  There is no bowel or bladder changes.  Activity definitely makes his pain worse and rest makes it better.  He has no left-sided symptoms.    On exam his incisions well-healed posteriorly.  He has no redness warmth swelling or puffiness.  He has decreased back range of motion due to guarding and pain.  I get 5/5 motor bilateral lower extremities.  He walks with a limp favoring the right side due to his radicular pain.    MRI was reviewed report was read as well.  He has moderate central stenosis at L2-3 and severe central stenosis and intraforaminal stenosis on the right at L3-4.  Looks like there is an attempted interbody fusion at L4-5.  He has an interspinous spacer at that level as well.  I do not see any significant stenosis at L4-5 particularly on the right side.  There is no stenosis at L5-S1 except minimal lateral recess stenosis on the  left.    Assessment/plan: Patient with above described imaging studies.  I think if the L2-3 and L3-4 levels, mainly the L3-4 level, this giving him his issues.  I had a long discussion with the patient and explained to them that their options are 1) live with the symptoms and see how they evolve, 2) physical therapy, 3) pain management or 4) surgery.    The patient understands that surgery is elective.  They understand that surgery comes with more risks than not doing surgery.  They understand they do not have to do surgery.  However, they wish to proceed with surgery.  All of the risks, benefits, and potential complications for operative and nonoperative treatment were discussed at length with the patient.  Risks of operative intervention include, but are not limited to: 1) anesthesia complications, 2) extensive blood loss, 3) infection, 4) damage to uninjured structures including, but not limited to: The dural sac and nerve roots, 5) blood clots, and 6) lack of improvement or worsening of symptoms.  These complications could result in death, permanent disability, or need for reoperation.  Upon leaving the office today the patient completely understood these risks and wishes to proceed with operative intervention.    We are going to perform an L2-3 and L3-4 midline laminectomy.  We will probably have to take the facet completely down at L3-4 on the right.  Therefore we we will be prepared to do instrumentation at L3-4 and possible interbody cage.  We will do removal of the interspinous spacer at L4-5 and we will do an exploration of spinal fusion at L4-5 and we may extend her fusion down to L4-5 if needed as well.  We will do a noninstrumented posterior lateral fusion at L2-3.    This patient smokes cigarettes and says he will stop prior to surgery.  He initially had concerns because he thought that it would make him increase his chances of cancer if he stop smoking.  I explained to him that is not the case and is  safe for him to quit smoking.    The patient has been prescribed a lumbar LSO back brace.  The orthosis is required to reduce pain by restricting mobility of the trunk.  The patient is to wear the prescribed item as instructed for all activities of daily living unless otherwise specified in my notes.  The patient may also remove for normal hygiene and sleep unless the device is in place for a fracture or other injury requiring 24-hour stabilization.      Philip Mclaughlin MD  Orthopedic surgery

## 2024-08-30 ENCOUNTER — TELEPHONE (OUTPATIENT)
Dept: ORTHOPEDIC SURGERY | Facility: CLINIC | Age: 54
End: 2024-08-30
Payer: MEDICARE

## 2024-09-09 ENCOUNTER — TELEPHONE (OUTPATIENT)
Dept: ORTHOPEDIC SURGERY | Facility: CLINIC | Age: 54
End: 2024-09-09
Payer: MEDICARE

## 2024-09-09 NOTE — TELEPHONE ENCOUNTER
9/3/24 3:27p LVM for Pt to call me back to schedule back surgery with Dr. Mclaughlin.    9/9/24 10:17am LVM for Pt to call me back if he wishes to proceed with a surgery date for his back. Information was scanned into the system.

## 2024-10-10 PROBLEM — Z98.1 ARTHRODESIS STATUS: Status: ACTIVE | Noted: 2024-12-09

## 2024-10-10 PROBLEM — M48.061 SPINAL STENOSIS, LUMBAR REGION WITHOUT NEUROGENIC CLAUDICATION: Status: ACTIVE | Noted: 2024-12-09

## 2024-11-19 ENCOUNTER — APPOINTMENT (OUTPATIENT)
Dept: PRIMARY CARE | Facility: CLINIC | Age: 54
End: 2024-11-19
Payer: MEDICARE

## 2024-11-19 VITALS
SYSTOLIC BLOOD PRESSURE: 130 MMHG | HEART RATE: 95 BPM | TEMPERATURE: 98 F | OXYGEN SATURATION: 96 % | BODY MASS INDEX: 24.55 KG/M2 | WEIGHT: 143 LBS | DIASTOLIC BLOOD PRESSURE: 68 MMHG

## 2024-11-19 DIAGNOSIS — I25.118 CORONARY ARTERY DISEASE OF NATIVE ARTERY OF NATIVE HEART WITH STABLE ANGINA PECTORIS: ICD-10-CM

## 2024-11-19 DIAGNOSIS — M48.061 SPINAL STENOSIS, LUMBAR REGION WITHOUT NEUROGENIC CLAUDICATION: ICD-10-CM

## 2024-11-19 DIAGNOSIS — Z01.818 PREOPERATIVE EVALUATION TO RULE OUT SURGICAL CONTRAINDICATION: Primary | ICD-10-CM

## 2024-11-19 DIAGNOSIS — I10 BENIGN ESSENTIAL HTN: ICD-10-CM

## 2024-11-19 PROBLEM — F17.200 TOBACCO USE DISORDER: Status: RESOLVED | Noted: 2023-12-05 | Resolved: 2024-11-19

## 2024-11-19 PROBLEM — E11.69 HYPERLIPIDEMIA DUE TO TYPE 2 DIABETES MELLITUS (MULTI): Status: RESOLVED | Noted: 2023-12-05 | Resolved: 2024-11-19

## 2024-11-19 PROBLEM — U07.1 DISEASE DUE TO SEVERE ACUTE RESPIRATORY SYNDROME CORONAVIRUS 2 (SARS-COV-2): Status: RESOLVED | Noted: 2023-12-05 | Resolved: 2024-11-19

## 2024-11-19 PROBLEM — E78.5 HYPERLIPIDEMIA DUE TO TYPE 2 DIABETES MELLITUS (MULTI): Status: RESOLVED | Noted: 2023-12-05 | Resolved: 2024-11-19

## 2024-11-19 PROBLEM — R06.02 SOB (SHORTNESS OF BREATH) ON EXERTION: Status: RESOLVED | Noted: 2023-12-05 | Resolved: 2024-11-19

## 2024-11-19 PROBLEM — E78.00 PURE HYPERCHOLESTEROLEMIA: Status: RESOLVED | Noted: 2023-12-05 | Resolved: 2024-11-19

## 2024-11-19 PROBLEM — R07.89 CHEST PRESSURE: Status: RESOLVED | Noted: 2023-12-05 | Resolved: 2024-11-19

## 2024-11-19 PROBLEM — E87.6 HYPOKALEMIA: Status: RESOLVED | Noted: 2019-04-11 | Resolved: 2024-11-19

## 2024-11-19 PROCEDURE — 99215 OFFICE O/P EST HI 40 MIN: CPT | Performed by: INTERNAL MEDICINE

## 2024-11-19 PROCEDURE — 4010F ACE/ARB THERAPY RXD/TAKEN: CPT | Performed by: INTERNAL MEDICINE

## 2024-11-19 PROCEDURE — G2211 COMPLEX E/M VISIT ADD ON: HCPCS | Performed by: INTERNAL MEDICINE

## 2024-11-19 PROCEDURE — 3075F SYST BP GE 130 - 139MM HG: CPT | Performed by: INTERNAL MEDICINE

## 2024-11-19 PROCEDURE — 3044F HG A1C LEVEL LT 7.0%: CPT | Performed by: INTERNAL MEDICINE

## 2024-11-19 PROCEDURE — 3078F DIAST BP <80 MM HG: CPT | Performed by: INTERNAL MEDICINE

## 2024-11-19 ASSESSMENT — ENCOUNTER SYMPTOMS: DEPRESSION: 0

## 2024-11-21 ENCOUNTER — LAB (OUTPATIENT)
Dept: LAB | Facility: LAB | Age: 54
End: 2024-11-21
Payer: MEDICARE

## 2024-11-21 DIAGNOSIS — Z01.818 PRE-OP EXAMINATION: ICD-10-CM

## 2024-11-21 DIAGNOSIS — M48.061 SPINAL STENOSIS, LUMBAR REGION WITHOUT NEUROGENIC CLAUDICATION: ICD-10-CM

## 2024-11-21 DIAGNOSIS — Z98.1 ARTHRODESIS STATUS: ICD-10-CM

## 2024-11-21 LAB
ALBUMIN SERPL BCP-MCNC: 4.5 G/DL (ref 3.4–5)
ALP SERPL-CCNC: 58 U/L (ref 33–120)
ALT SERPL W P-5'-P-CCNC: 13 U/L (ref 10–52)
ANION GAP SERPL CALC-SCNC: 11 MMOL/L (ref 10–20)
APTT PPP: 30 SECONDS (ref 27–38)
AST SERPL W P-5'-P-CCNC: 12 U/L (ref 9–39)
BASOPHILS # BLD AUTO: 0.11 X10*3/UL (ref 0–0.1)
BASOPHILS NFR BLD AUTO: 1.3 %
BILIRUB SERPL-MCNC: 0.5 MG/DL (ref 0–1.2)
BUN SERPL-MCNC: 15 MG/DL (ref 6–23)
CALCIUM SERPL-MCNC: 9.3 MG/DL (ref 8.6–10.3)
CHLORIDE SERPL-SCNC: 102 MMOL/L (ref 98–107)
CO2 SERPL-SCNC: 28 MMOL/L (ref 21–32)
CREAT SERPL-MCNC: 0.73 MG/DL (ref 0.5–1.3)
EGFRCR SERPLBLD CKD-EPI 2021: >90 ML/MIN/1.73M*2
EOSINOPHIL # BLD AUTO: 0.41 X10*3/UL (ref 0–0.7)
EOSINOPHIL NFR BLD AUTO: 4.9 %
ERYTHROCYTE [DISTWIDTH] IN BLOOD BY AUTOMATED COUNT: 12.4 % (ref 11.5–14.5)
EST. AVERAGE GLUCOSE BLD GHB EST-MCNC: 148 MG/DL
GLUCOSE SERPL-MCNC: 100 MG/DL (ref 74–99)
HBA1C MFR BLD: 6.8 %
HCT VFR BLD AUTO: 45.1 % (ref 41–52)
HGB BLD-MCNC: 15.5 G/DL (ref 13.5–17.5)
IMM GRANULOCYTES # BLD AUTO: 0.01 X10*3/UL (ref 0–0.7)
IMM GRANULOCYTES NFR BLD AUTO: 0.1 % (ref 0–0.9)
INR PPP: 1.1 (ref 0.9–1.1)
LYMPHOCYTES # BLD AUTO: 3.18 X10*3/UL (ref 1.2–4.8)
LYMPHOCYTES NFR BLD AUTO: 38 %
MCH RBC QN AUTO: 30.5 PG (ref 26–34)
MCHC RBC AUTO-ENTMCNC: 34.4 G/DL (ref 32–36)
MCV RBC AUTO: 89 FL (ref 80–100)
MONOCYTES # BLD AUTO: 0.59 X10*3/UL (ref 0.1–1)
MONOCYTES NFR BLD AUTO: 7.1 %
NEUTROPHILS # BLD AUTO: 4.06 X10*3/UL (ref 1.2–7.7)
NEUTROPHILS NFR BLD AUTO: 48.6 %
NRBC BLD-RTO: 0 /100 WBCS (ref 0–0)
PLATELET # BLD AUTO: 220 X10*3/UL (ref 150–450)
POTASSIUM SERPL-SCNC: 4.1 MMOL/L (ref 3.5–5.3)
PROT SERPL-MCNC: 7.2 G/DL (ref 6.4–8.2)
PROTHROMBIN TIME: 12.1 SECONDS (ref 9.8–12.8)
RBC # BLD AUTO: 5.09 X10*6/UL (ref 4.5–5.9)
SODIUM SERPL-SCNC: 137 MMOL/L (ref 136–145)
WBC # BLD AUTO: 8.4 X10*3/UL (ref 4.4–11.3)

## 2024-11-21 PROCEDURE — 80053 COMPREHEN METABOLIC PANEL: CPT

## 2024-11-21 PROCEDURE — 85730 THROMBOPLASTIN TIME PARTIAL: CPT

## 2024-11-21 PROCEDURE — 85610 PROTHROMBIN TIME: CPT

## 2024-11-21 PROCEDURE — 83036 HEMOGLOBIN GLYCOSYLATED A1C: CPT

## 2024-11-21 PROCEDURE — 85025 COMPLETE CBC W/AUTO DIFF WBC: CPT

## 2024-11-21 PROCEDURE — 36415 COLL VENOUS BLD VENIPUNCTURE: CPT

## 2024-11-26 ENCOUNTER — LAB (OUTPATIENT)
Dept: LAB | Facility: HOSPITAL | Age: 54
End: 2024-11-26
Payer: MEDICARE

## 2024-11-26 ENCOUNTER — HOSPITAL ENCOUNTER (OUTPATIENT)
Dept: CARDIOLOGY | Facility: HOSPITAL | Age: 54
Discharge: HOME | End: 2024-11-26
Payer: MEDICARE

## 2024-11-26 ENCOUNTER — OFFICE VISIT (OUTPATIENT)
Dept: ORTHOPEDIC SURGERY | Facility: CLINIC | Age: 54
End: 2024-11-26
Payer: MEDICARE

## 2024-11-26 VITALS — WEIGHT: 154 LBS | BODY MASS INDEX: 26.29 KG/M2 | HEIGHT: 64 IN

## 2024-11-26 DIAGNOSIS — Z98.1 ARTHRODESIS STATUS: ICD-10-CM

## 2024-11-26 DIAGNOSIS — Z01.818 PRE-OP EXAMINATION: ICD-10-CM

## 2024-11-26 DIAGNOSIS — M48.061 SPINAL STENOSIS, LUMBAR REGION WITHOUT NEUROGENIC CLAUDICATION: ICD-10-CM

## 2024-11-26 DIAGNOSIS — M54.16 LUMBAR RADICULOPATHY: Primary | ICD-10-CM

## 2024-11-26 LAB
ATRIAL RATE: 56 BPM
P AXIS: 56 DEGREES
P OFFSET: 205 MS
P ONSET: 160 MS
PR INTERVAL: 124 MS
Q ONSET: 222 MS
QRS COUNT: 9 BEATS
QRS DURATION: 96 MS
QT INTERVAL: 362 MS
QTC CALCULATION(BAZETT): 349 MS
QTC FREDERICIA: 354 MS
R AXIS: 78 DEGREES
T AXIS: 30 DEGREES
T OFFSET: 403 MS
VENTRICULAR RATE: 56 BPM

## 2024-11-26 PROCEDURE — 93010 ELECTROCARDIOGRAM REPORT: CPT | Performed by: INTERNAL MEDICINE

## 2024-11-26 PROCEDURE — 87081 CULTURE SCREEN ONLY: CPT | Mod: ELYLAB

## 2024-11-26 PROCEDURE — 93005 ELECTROCARDIOGRAM TRACING: CPT

## 2024-11-26 PROCEDURE — L0650 LSO SC R ANT/POS PNL PRE OTS: HCPCS | Performed by: ORTHOPAEDIC SURGERY

## 2024-11-26 PROCEDURE — 99211 OFF/OP EST MAY X REQ PHY/QHP: CPT | Performed by: ORTHOPAEDIC SURGERY

## 2024-11-26 NOTE — PROGRESS NOTES
Damián Gutiérrez is a 54 y.o. male who presents for Follow-up and Pre-op Visit of the Lower Back (Sx on 12/09/2024).    HPI:  54-year-old gentleman here for preop visit.  He is scheduled undergo an L2-L4 midline laminectomy, possible L3-4 TLIF if iatrogenic instability is caused.  Removal of interspinous process fusion device at L4-5.  He denies any fever chills nausea vomiting night sweats.  He has no bowel or bladder complaints.    Physical exam:  Well-nourished, well kept.No lymphangitis or lymphadenopathy in the examined extremities.  Gait normal.  Can stand on heels and toes.   Examination of the back shows minimal tenderness in the paraspinous musculature.  There is mildly decreased range of motion in all directions due to guarding/muscle spasms and pain at extremes.  There is good strength and no instability.  Examination of the lower extremities reveals no point tenderness, swelling, or deformity.  Range of motion of the hips, knees, and ankles are full without crepitance, instability, or exacerbation of pain.  Strength is 5/5 throughout.  No redness, abrasions, or lesions on extremities  Gross sensation intact in the extremities.  Clonus negative.  Affect normal.  Alert and oriented ×3.  Coordination normal.    Assessment:  54-year-old gentleman here for preop visit.  He is scheduled undergo an L2-L4 midline laminectomy with a possible L3-4 TLIF if iatrogenic instability is encountered during surgery.  Potential removal of interspinous process fusion device at L4-5 and potential extension of hardware at that level as well.  Pre and postoperative information instructions were given to the patient.  The brace was fitted today, brace instructions were given.  Lifting twisting bending restrictions were discussed.  Wound care was discussed.  Risks and benefits were discussed with Dr. Mclaughlin on August 29, 2024.  Patient has most of his preop lab work taken care of, there is an insurance issue with some of  those required preoperative tests which will be managed and taken care of prior to his surgery.  He will be stopping his 81 mg aspirin 10 days prior to surgery as well as all other anti-inflammatories and vitamins and supplements.    Plan:  We will see him back in the office 2 weeks after his surgery.  We should plan on getting AP lateral x-rays.    Dameon Woodson PA-C

## 2024-11-27 ASSESSMENT — ENCOUNTER SYMPTOMS
ABDOMINAL PAIN: 0
SORE THROAT: 0
ACTIVITY CHANGE: 0
DYSURIA: 0
COUGH: 0
MYALGIAS: 0
LIGHT-HEADEDNESS: 0

## 2024-11-27 NOTE — PROGRESS NOTES
Damián Gutiérrez, mickey 54 y.o. male was seen today:     Chief Complaint   Patient presents with    surgery clearance     Spinal fusion with . Patient has not been seen by cardiology since Feb 2024       VISIT KAROL:    Damián Gutiérrez   Here for for presurgical risk stratification:     Surgery/procedure : L2-L4 laminectomy  Type: Elective, Low risk     Cardiac Risk:  Active Cardiac Condition:  Negative for ACS. No severe valvular disease, Decompensated HF, Unstable arrhythmia  Revised Cardiac Risk Index  (RCRI) : < 1%  Functional Capacity:  MET Moderate (4-7):  Climbing a flight of stairs, Walking 4 mph, Yard work   Cardiac Stress Testing: Not indicated     Other Medical Risk:   Hepatic Risk : No Cirrhosis (absence of ascites, hepatic encephalopathy, varices, altered hemostasis or pharmacology)  Adrenal insufficiency Risk : Not on chronic prednisone. Does not require stress dose steroid  Endocrine:  No Long acting Insulin or Oral hypoglycemics.. Decrease 25% of PM dose, 50% of AM dose.  Hold all oral hypoglycemics on the day of surgery.   Pulmonary Risk:  Non Smoker. No CXR or PFT needed.   Thyroid Risk:  Currently euthyroid state.   Rheumatology Risk: None. No need for flexion-extension neck film    Delirium Risk: No baseline dementia. Continue anti depressant     Medications:  ASA/Antiplatelet/NSAIDs :  Hold 10 days prior to surgery. Patient is on Aspirin   Beta Blockers/ CCBs/ Alpha 2 agonist: If any, should continue  Statin, H2/PPi/Digoxin: if any, should continue    Anticoagulant: None    DOACs, ACEi/ARBs, SGL2, Duretics; None       CONSULT RECOMMENDATION:   - Patient has acceptable risk to under go above mentioned surgery    -  Pre surgical risk stratification is done per protocol considering all current co mobilities.   - Recent lab work, EKG and relevant imaging will also be reviewed by the surgical team.    - Special Instruction: Hold aspirin 10 days prior to the surgery    - Home  meds will be resumed post operatively at the discretion of surgeon.    - Incentive spirometry and early mobilization post operatively.        MEDICAL DECISION MAKING:  - The current and active medical co morbidities have been considered.   - Recent lab work and relevant imaging studies have been reviewed.    - Relevant correspondence/notes from specialty consultants were reviewed.    - Next Follow up: 14 days after surgery.   - Thanks for the consult.       MEDICATIONS:   Current Outpatient Medications   Medication Instructions    amLODIPine (NORVASC) 5 mg, oral, 2 times daily    aspirin 81 mg, Daily    esomeprazole magnesium (NexIUM 24HR) 20 mg tablet,delayed release (DR/EC) 2 tablets, Daily    ezetimibe (ZETIA) 10 mg, oral, Daily    lisinopril 10 mg, oral, Daily, for blood pressure    metoprolol succinate XL (TOPROL-XL) 25 mg, oral, Daily, Do not crush or chew.    nitroglycerin (NITROSTAT) 0.4 mg, sublingual, Every 5 min PRN       TODAY'S VISIT  DX:     1. Preoperative evaluation to rule out surgical contraindication  Acceptable risk to undergo L2- L4 laminectomy elective surgery.      2. Coronary artery disease of native artery of native heart with stable angina pectoris  Stable.  Did not take any nitroglycerin sublingual for few months      3. Spinal stenosis, lumbar region without neurogenic claudication  Disability Placard.  Undergoing laminectomy.      4. Benign essential HTN  Normotensive.  Office blood pressure 130/68 mmHg.  Currently on antihypertensives. .        Review of Systems   Constitutional:  Negative for activity change.   HENT:  Negative for congestion and sore throat.    Respiratory:  Negative for cough.    Cardiovascular:  Negative for chest pain.   Gastrointestinal:  Negative for abdominal pain.   Endocrine: Negative for polyuria.   Genitourinary:  Negative for dysuria.   Musculoskeletal:  Negative for myalgias.   Skin:  Negative for rash.   Neurological:  Negative for light-headedness.    Psychiatric/Behavioral:  Negative for behavioral problems.      Visit Vitals  /68 (BP Location: Left arm, Patient Position: Sitting, BP Cuff Size: Adult)   Pulse 95   Temp 36.7 °C (98 °F) (Tympanic)   Wt 64.9 kg (143 lb)   SpO2 96%   BMI 24.55 kg/m²   Smoking Status Every Day   BSA 1.71 m²         Wt Readings from Last 10 Encounters:   11/26/24 69.9 kg (154 lb)   11/19/24 64.9 kg (143 lb)   07/29/24 70.9 kg (156 lb 6.4 oz)   03/04/24 72.9 kg (160 lb 12.8 oz)   02/22/24 72.8 kg (160 lb 8 oz)   01/05/24 68.9 kg (151 lb 14.4 oz)   12/07/23 68.9 kg (152 lb)   12/08/22 66.5 kg (146 lb 8 oz)   10/20/22 65.8 kg (145 lb)   12/16/21 72.3 kg (159 lb 6 oz)     Physical Exam  Vitals and nursing note reviewed.   Constitutional:       Appearance: Normal appearance.   HENT:      Head: Normocephalic.      Right Ear: Tympanic membrane normal.      Left Ear: Tympanic membrane normal.      Nose: Nose normal.      Mouth/Throat:      Mouth: Mucous membranes are moist.   Cardiovascular:      Rate and Rhythm: Normal rate and regular rhythm.      Pulses: Normal pulses.      Heart sounds: No murmur heard.  Pulmonary:      Effort: No respiratory distress.      Breath sounds: Normal breath sounds.   Abdominal:      Palpations: Abdomen is soft.   Musculoskeletal:      Cervical back: Neck supple.      Right lower leg: No edema.      Left lower leg: No edema.   Skin:     General: Skin is warm.      Findings: No rash.   Neurological:      General: No focal deficit present.      Mental Status: He is alert and oriented to person, place, and time.   Psychiatric:         Mood and Affect: Mood normal.        RECENT LABS:  Lab Results   Component Value Date    WBC 8.4 11/21/2024    HGB 15.5 11/21/2024    HCT 45.1 11/21/2024     11/21/2024    CHOL 264 (H) 12/04/2023    TRIG 129 12/04/2023    HDL 47.0 12/04/2023    ALT 13 11/21/2024    AST 12 11/21/2024     11/21/2024    K 4.1 11/21/2024     11/21/2024    CREATININE 0.73  11/21/2024    BUN 15 11/21/2024    CO2 28 11/21/2024    TSH 0.96 01/04/2018    PSA 0.56 10/24/2022    INR 1.1 11/21/2024    HGBA1C 6.8 (H) 11/21/2024     Lab Results   Component Value Date    GLUCOSE 100 (H) 11/21/2024    CALCIUM 9.3 11/21/2024     11/21/2024    K 4.1 11/21/2024    CO2 28 11/21/2024     11/21/2024    BUN 15 11/21/2024    CREATININE 0.73 11/21/2024      Lab Results   Component Value Date    HGBA1C 6.8 (H) 11/21/2024    HGBA1C 7.6 01/08/2021    HGBA1C 6.7 01/04/2018     Lab Results   Component Value Date    LDLCALC 191 (H) 12/04/2023    CREATININE 0.73 11/21/2024             P.S: This note was completed using Dragon voice recognition technology and may include unintended errors with respect to translation of words, typographical errors or grammar errors which may not have been identified while finalizing the chart.

## 2024-11-28 LAB — STAPHYLOCOCCUS SPEC CULT: NORMAL

## 2024-12-06 PROBLEM — M54.40 BACK PAIN OF LUMBAR REGION WITH SCIATICA: Status: ACTIVE | Noted: 2024-12-06

## 2024-12-09 ENCOUNTER — ANESTHESIA EVENT (OUTPATIENT)
Dept: OPERATING ROOM | Facility: HOSPITAL | Age: 54
DRG: 428 | End: 2024-12-09
Payer: MEDICARE

## 2024-12-09 ENCOUNTER — HOSPITAL ENCOUNTER (INPATIENT)
Facility: HOSPITAL | Age: 54
LOS: 2 days | Discharge: HOME | DRG: 428 | End: 2024-12-11
Attending: ORTHOPAEDIC SURGERY | Admitting: ORTHOPAEDIC SURGERY
Payer: MEDICARE

## 2024-12-09 ENCOUNTER — APPOINTMENT (OUTPATIENT)
Dept: RADIOLOGY | Facility: HOSPITAL | Age: 54
DRG: 428 | End: 2024-12-09
Payer: MEDICARE

## 2024-12-09 ENCOUNTER — ANESTHESIA (OUTPATIENT)
Dept: OPERATING ROOM | Facility: HOSPITAL | Age: 54
DRG: 428 | End: 2024-12-09
Payer: MEDICARE

## 2024-12-09 DIAGNOSIS — M48.061 SPINAL STENOSIS, LUMBAR REGION WITHOUT NEUROGENIC CLAUDICATION: ICD-10-CM

## 2024-12-09 DIAGNOSIS — I25.10 CORONARY ARTERY DISEASE INVOLVING NATIVE CORONARY ARTERY OF NATIVE HEART WITHOUT ANGINA PECTORIS: Chronic | ICD-10-CM

## 2024-12-09 DIAGNOSIS — M54.40 BACK PAIN OF LUMBAR REGION WITH SCIATICA: Primary | ICD-10-CM

## 2024-12-09 DIAGNOSIS — Z98.1 ARTHRODESIS STATUS: ICD-10-CM

## 2024-12-09 LAB
ABO GROUP (TYPE) IN BLOOD: NORMAL
ANTIBODY SCREEN: NORMAL
GLUCOSE BLD MANUAL STRIP-MCNC: 131 MG/DL (ref 74–99)
GLUCOSE BLD MANUAL STRIP-MCNC: 180 MG/DL (ref 74–99)
RH FACTOR (ANTIGEN D): NORMAL

## 2024-12-09 PROCEDURE — 2720000007 HC OR 272 NO HCPCS: Performed by: ORTHOPAEDIC SURGERY

## 2024-12-09 PROCEDURE — C1889 IMPLANT/INSERT DEVICE, NOC: HCPCS | Performed by: ORTHOPAEDIC SURGERY

## 2024-12-09 PROCEDURE — 2500000002 HC RX 250 W HCPCS SELF ADMINISTERED DRUGS (ALT 637 FOR MEDICARE OP, ALT 636 FOR OP/ED): Performed by: PHYSICIAN ASSISTANT

## 2024-12-09 PROCEDURE — 0SG00AJ FUSION OF LUMBAR VERTEBRAL JOINT WITH INTERBODY FUSION DEVICE, POSTERIOR APPROACH, ANTERIOR COLUMN, OPEN APPROACH: ICD-10-PCS | Performed by: ORTHOPAEDIC SURGERY

## 2024-12-09 PROCEDURE — 3600000004 HC OR TIME - INITIAL BASE CHARGE - PROCEDURE LEVEL FOUR: Performed by: ORTHOPAEDIC SURGERY

## 2024-12-09 PROCEDURE — C1821 INTERSPINOUS IMPLANT: HCPCS | Performed by: ORTHOPAEDIC SURGERY

## 2024-12-09 PROCEDURE — C1713 ANCHOR/SCREW BN/BN,TIS/BN: HCPCS | Performed by: ORTHOPAEDIC SURGERY

## 2024-12-09 PROCEDURE — 2500000001 HC RX 250 WO HCPCS SELF ADMINISTERED DRUGS (ALT 637 FOR MEDICARE OP): Performed by: PHYSICIAN ASSISTANT

## 2024-12-09 PROCEDURE — 36415 COLL VENOUS BLD VENIPUNCTURE: CPT | Performed by: PHYSICIAN ASSISTANT

## 2024-12-09 PROCEDURE — A4340 INDWELLING CATHETER SPECIAL: HCPCS | Performed by: ORTHOPAEDIC SURGERY

## 2024-12-09 PROCEDURE — 7100000001 HC RECOVERY ROOM TIME - INITIAL BASE CHARGE: Performed by: ORTHOPAEDIC SURGERY

## 2024-12-09 PROCEDURE — 22830 EXPLORATION OF SPINAL FUSION: CPT | Performed by: ORTHOPAEDIC SURGERY

## 2024-12-09 PROCEDURE — 22614 ARTHRD PST TQ 1NTRSPC EA ADD: CPT | Performed by: ORTHOPAEDIC SURGERY

## 2024-12-09 PROCEDURE — 22840 INSERT SPINE FIXATION DEVICE: CPT | Performed by: ORTHOPAEDIC SURGERY

## 2024-12-09 PROCEDURE — 22612 ARTHRD PST TQ 1NTRSPC LUMBAR: CPT | Performed by: PHYSICIAN ASSISTANT

## 2024-12-09 PROCEDURE — 20930 SP BONE ALGRFT MORSEL ADD-ON: CPT | Performed by: PHYSICIAN ASSISTANT

## 2024-12-09 PROCEDURE — 22840 INSERT SPINE FIXATION DEVICE: CPT | Performed by: PHYSICIAN ASSISTANT

## 2024-12-09 PROCEDURE — 63052 LAM FACETC/FRMT ARTHRD LUM 1: CPT | Performed by: PHYSICIAN ASSISTANT

## 2024-12-09 PROCEDURE — 22853 INSJ BIOMECHANICAL DEVICE: CPT | Performed by: ORTHOPAEDIC SURGERY

## 2024-12-09 PROCEDURE — 01550ZZ DESTRUCTION OF MEDIAN NERVE, OPEN APPROACH: ICD-10-PCS | Performed by: ORTHOPAEDIC SURGERY

## 2024-12-09 PROCEDURE — 2500000004 HC RX 250 GENERAL PHARMACY W/ HCPCS (ALT 636 FOR OP/ED): Performed by: NURSE ANESTHETIST, CERTIFIED REGISTERED

## 2024-12-09 PROCEDURE — 0ST20ZZ RESECTION OF LUMBAR VERTEBRAL DISC, OPEN APPROACH: ICD-10-PCS | Performed by: ORTHOPAEDIC SURGERY

## 2024-12-09 PROCEDURE — 63052 LAM FACETC/FRMT ARTHRD LUM 1: CPT | Performed by: ORTHOPAEDIC SURGERY

## 2024-12-09 PROCEDURE — 22830 EXPLORATION OF SPINAL FUSION: CPT | Performed by: PHYSICIAN ASSISTANT

## 2024-12-09 PROCEDURE — 2500000004 HC RX 250 GENERAL PHARMACY W/ HCPCS (ALT 636 FOR OP/ED): Performed by: ANESTHESIOLOGY

## 2024-12-09 PROCEDURE — 64636 DESTROY L/S FACET JNT ADDL: CPT | Performed by: ORTHOPAEDIC SURGERY

## 2024-12-09 PROCEDURE — 3600000009 HC OR TIME - EACH INCREMENTAL 1 MINUTE - PROCEDURE LEVEL FOUR: Performed by: ORTHOPAEDIC SURGERY

## 2024-12-09 PROCEDURE — 0SG1071 FUSION OF 2 OR MORE LUMBAR VERTEBRAL JOINTS WITH AUTOLOGOUS TISSUE SUBSTITUTE, POSTERIOR APPROACH, POSTERIOR COLUMN, OPEN APPROACH: ICD-10-PCS | Performed by: ORTHOPAEDIC SURGERY

## 2024-12-09 PROCEDURE — 2500000004 HC RX 250 GENERAL PHARMACY W/ HCPCS (ALT 636 FOR OP/ED): Mod: JZ | Performed by: PHYSICIAN ASSISTANT

## 2024-12-09 PROCEDURE — 88307 TISSUE EXAM BY PATHOLOGIST: CPT | Mod: TC,STJLAB,WESLAB | Performed by: ORTHOPAEDIC SURGERY

## 2024-12-09 PROCEDURE — 63047 LAM FACETEC & FORAMOT LUMBAR: CPT | Performed by: ORTHOPAEDIC SURGERY

## 2024-12-09 PROCEDURE — 20936 SP BONE AGRFT LOCAL ADD-ON: CPT | Performed by: ORTHOPAEDIC SURGERY

## 2024-12-09 PROCEDURE — 00NY0ZZ RELEASE LUMBAR SPINAL CORD, OPEN APPROACH: ICD-10-PCS | Performed by: ORTHOPAEDIC SURGERY

## 2024-12-09 PROCEDURE — 20930 SP BONE ALGRFT MORSEL ADD-ON: CPT | Performed by: ORTHOPAEDIC SURGERY

## 2024-12-09 PROCEDURE — 63056 DECOMPRESS SPINAL CORD LMBR: CPT | Performed by: ORTHOPAEDIC SURGERY

## 2024-12-09 PROCEDURE — 2500000005 HC RX 250 GENERAL PHARMACY W/O HCPCS: Performed by: PHYSICIAN ASSISTANT

## 2024-12-09 PROCEDURE — 86850 RBC ANTIBODY SCREEN: CPT | Performed by: PHYSICIAN ASSISTANT

## 2024-12-09 PROCEDURE — 63047 LAM FACETEC & FORAMOT LUMBAR: CPT | Performed by: PHYSICIAN ASSISTANT

## 2024-12-09 PROCEDURE — 2500000002 HC RX 250 W HCPCS SELF ADMINISTERED DRUGS (ALT 637 FOR MEDICARE OP, ALT 636 FOR OP/ED): Performed by: INTERNAL MEDICINE

## 2024-12-09 PROCEDURE — 3700000002 HC GENERAL ANESTHESIA TIME - EACH INCREMENTAL 1 MINUTE: Performed by: ORTHOPAEDIC SURGERY

## 2024-12-09 PROCEDURE — 22853 INSJ BIOMECHANICAL DEVICE: CPT | Performed by: PHYSICIAN ASSISTANT

## 2024-12-09 PROCEDURE — 2500000005 HC RX 250 GENERAL PHARMACY W/O HCPCS: Performed by: ORTHOPAEDIC SURGERY

## 2024-12-09 PROCEDURE — 82947 ASSAY GLUCOSE BLOOD QUANT: CPT

## 2024-12-09 PROCEDURE — 22630 ARTHRD PST TQ 1NTRSPC LUM: CPT | Performed by: ORTHOPAEDIC SURGERY

## 2024-12-09 PROCEDURE — S4991 NICOTINE PATCH NONLEGEND: HCPCS | Performed by: INTERNAL MEDICINE

## 2024-12-09 PROCEDURE — 22612 ARTHRD PST TQ 1NTRSPC LUMBAR: CPT | Performed by: ORTHOPAEDIC SURGERY

## 2024-12-09 PROCEDURE — 64635 DESTROY LUMB/SAC FACET JNT: CPT | Performed by: ORTHOPAEDIC SURGERY

## 2024-12-09 PROCEDURE — 1100000001 HC PRIVATE ROOM DAILY

## 2024-12-09 PROCEDURE — 20936 SP BONE AGRFT LOCAL ADD-ON: CPT | Performed by: PHYSICIAN ASSISTANT

## 2024-12-09 PROCEDURE — 22630 ARTHRD PST TQ 1NTRSPC LUM: CPT | Performed by: PHYSICIAN ASSISTANT

## 2024-12-09 PROCEDURE — 22614 ARTHRD PST TQ 1NTRSPC EA ADD: CPT | Performed by: PHYSICIAN ASSISTANT

## 2024-12-09 PROCEDURE — 7100000002 HC RECOVERY ROOM TIME - EACH INCREMENTAL 1 MINUTE: Performed by: ORTHOPAEDIC SURGERY

## 2024-12-09 PROCEDURE — 2780000003 HC OR 278 NO HCPCS: Performed by: ORTHOPAEDIC SURGERY

## 2024-12-09 PROCEDURE — 01NB0ZZ RELEASE LUMBAR NERVE, OPEN APPROACH: ICD-10-PCS | Performed by: ORTHOPAEDIC SURGERY

## 2024-12-09 PROCEDURE — 2500000005 HC RX 250 GENERAL PHARMACY W/O HCPCS: Performed by: ANESTHESIOLOGY

## 2024-12-09 PROCEDURE — C1729 CATH, DRAINAGE: HCPCS | Performed by: ORTHOPAEDIC SURGERY

## 2024-12-09 PROCEDURE — 63056 DECOMPRESS SPINAL CORD LMBR: CPT | Performed by: PHYSICIAN ASSISTANT

## 2024-12-09 PROCEDURE — 3700000001 HC GENERAL ANESTHESIA TIME - INITIAL BASE CHARGE: Performed by: ORTHOPAEDIC SURGERY

## 2024-12-09 PROCEDURE — 99221 1ST HOSP IP/OBS SF/LOW 40: CPT | Performed by: INTERNAL MEDICINE

## 2024-12-09 DEVICE — IMPLANTABLE DEVICE: Type: IMPLANTABLE DEVICE | Site: SPINE LUMBAR | Status: FUNCTIONAL

## 2024-12-09 DEVICE — SCREW, PEDICLE, 6.5 X 55MM: Type: IMPLANTABLE DEVICE | Site: SPINE LUMBAR | Status: FUNCTIONAL

## 2024-12-09 DEVICE — BONE GRAFT SUBSTITUTE - SILICATE SUBSTITUTED CALCIUM PHOSPHATE - 7.5ML PACK SIZE
Type: IMPLANTABLE DEVICE | Site: SPINE LUMBAR | Status: FUNCTIONAL
Brand: ACTIFUSE MIS

## 2024-12-09 DEVICE — BONE GRAFT SUBSTITUTE - SILICATE SUBSTITUTED CALCIUM PHOSPHATE - 15.8ML PACK SIZE
Type: IMPLANTABLE DEVICE | Site: SPINE LUMBAR | Status: FUNCTIONAL
Brand: ACTIFUSE SHAPE

## 2024-12-09 DEVICE — SCREW SET, REVERE LOCKING CAP: Type: IMPLANTABLE DEVICE | Site: SPINE LUMBAR | Status: FUNCTIONAL

## 2024-12-09 RX ORDER — OXYCODONE HYDROCHLORIDE 10 MG/1
10 TABLET ORAL EVERY 4 HOURS PRN
Status: DISCONTINUED | OUTPATIENT
Start: 2024-12-09 | End: 2024-12-11 | Stop reason: HOSPADM

## 2024-12-09 RX ORDER — HYDROMORPHONE HYDROCHLORIDE 1 MG/ML
INJECTION, SOLUTION INTRAMUSCULAR; INTRAVENOUS; SUBCUTANEOUS AS NEEDED
Status: DISCONTINUED | OUTPATIENT
Start: 2024-12-09 | End: 2024-12-09

## 2024-12-09 RX ORDER — LIDOCAINE HYDROCHLORIDE 20 MG/ML
INJECTION, SOLUTION EPIDURAL; INFILTRATION; INTRACAUDAL; PERINEURAL AS NEEDED
Status: DISCONTINUED | OUTPATIENT
Start: 2024-12-09 | End: 2024-12-09

## 2024-12-09 RX ORDER — OXYCODONE HYDROCHLORIDE 5 MG/1
5 TABLET ORAL EVERY 4 HOURS PRN
Status: DISCONTINUED | OUTPATIENT
Start: 2024-12-09 | End: 2024-12-09 | Stop reason: HOSPADM

## 2024-12-09 RX ORDER — SODIUM CHLORIDE, SODIUM LACTATE, POTASSIUM CHLORIDE, CALCIUM CHLORIDE 600; 310; 30; 20 MG/100ML; MG/100ML; MG/100ML; MG/100ML
100 INJECTION, SOLUTION INTRAVENOUS CONTINUOUS
Status: DISCONTINUED | OUTPATIENT
Start: 2024-12-09 | End: 2024-12-09 | Stop reason: HOSPADM

## 2024-12-09 RX ORDER — TRANEXAMIC ACID 650 MG/1
1950 TABLET ORAL ONCE
Status: COMPLETED | OUTPATIENT
Start: 2024-12-09 | End: 2024-12-09

## 2024-12-09 RX ORDER — EZETIMIBE 10 MG/1
10 TABLET ORAL DAILY
Status: DISCONTINUED | OUTPATIENT
Start: 2024-12-09 | End: 2024-12-11 | Stop reason: HOSPADM

## 2024-12-09 RX ORDER — DIPHENHYDRAMINE HYDROCHLORIDE 50 MG/ML
12.5 INJECTION INTRAMUSCULAR; INTRAVENOUS ONCE AS NEEDED
Status: DISCONTINUED | OUTPATIENT
Start: 2024-12-09 | End: 2024-12-09 | Stop reason: HOSPADM

## 2024-12-09 RX ORDER — MEPERIDINE HYDROCHLORIDE 50 MG/ML
12.5 INJECTION INTRAMUSCULAR; INTRAVENOUS; SUBCUTANEOUS EVERY 10 MIN PRN
Status: DISCONTINUED | OUTPATIENT
Start: 2024-12-09 | End: 2024-12-09 | Stop reason: HOSPADM

## 2024-12-09 RX ORDER — BUPIVACAINE HCL/EPINEPHRINE 0.5-1:200K
VIAL (ML) INJECTION AS NEEDED
Status: DISCONTINUED | OUTPATIENT
Start: 2024-12-09 | End: 2024-12-09 | Stop reason: HOSPADM

## 2024-12-09 RX ORDER — OXYCODONE HYDROCHLORIDE 5 MG/1
2.5 TABLET ORAL EVERY 4 HOURS PRN
Status: DISCONTINUED | OUTPATIENT
Start: 2024-12-09 | End: 2024-12-11 | Stop reason: HOSPADM

## 2024-12-09 RX ORDER — ONDANSETRON HYDROCHLORIDE 2 MG/ML
INJECTION, SOLUTION INTRAVENOUS AS NEEDED
Status: DISCONTINUED | OUTPATIENT
Start: 2024-12-09 | End: 2024-12-09

## 2024-12-09 RX ORDER — ALBUTEROL SULFATE 0.83 MG/ML
2.5 SOLUTION RESPIRATORY (INHALATION) ONCE AS NEEDED
Status: DISCONTINUED | OUTPATIENT
Start: 2024-12-09 | End: 2024-12-09 | Stop reason: HOSPADM

## 2024-12-09 RX ORDER — ACETAMINOPHEN 325 MG/1
650 TABLET ORAL EVERY 6 HOURS
Status: DISCONTINUED | OUTPATIENT
Start: 2024-12-09 | End: 2024-12-11 | Stop reason: HOSPADM

## 2024-12-09 RX ORDER — SODIUM CHLORIDE 9 MG/ML
100 INJECTION, SOLUTION INTRAVENOUS CONTINUOUS
Status: ACTIVE | OUTPATIENT
Start: 2024-12-09 | End: 2024-12-10

## 2024-12-09 RX ORDER — POLYETHYLENE GLYCOL 3350 17 G/17G
17 POWDER, FOR SOLUTION ORAL DAILY
Status: DISCONTINUED | OUTPATIENT
Start: 2024-12-09 | End: 2024-12-11 | Stop reason: HOSPADM

## 2024-12-09 RX ORDER — ONDANSETRON HYDROCHLORIDE 2 MG/ML
4 INJECTION, SOLUTION INTRAVENOUS ONCE AS NEEDED
Status: COMPLETED | OUTPATIENT
Start: 2024-12-09 | End: 2024-12-09

## 2024-12-09 RX ORDER — PROPOFOL 10 MG/ML
INJECTION, EMULSION INTRAVENOUS AS NEEDED
Status: DISCONTINUED | OUTPATIENT
Start: 2024-12-09 | End: 2024-12-09

## 2024-12-09 RX ORDER — MIDAZOLAM HYDROCHLORIDE 1 MG/ML
INJECTION, SOLUTION INTRAMUSCULAR; INTRAVENOUS AS NEEDED
Status: DISCONTINUED | OUTPATIENT
Start: 2024-12-09 | End: 2024-12-09

## 2024-12-09 RX ORDER — FENTANYL CITRATE 50 UG/ML
12.5 INJECTION, SOLUTION INTRAMUSCULAR; INTRAVENOUS EVERY 5 MIN PRN
Status: DISCONTINUED | OUTPATIENT
Start: 2024-12-09 | End: 2024-12-09 | Stop reason: HOSPADM

## 2024-12-09 RX ORDER — PANTOPRAZOLE SODIUM 40 MG/1
40 TABLET, DELAYED RELEASE ORAL
Status: DISCONTINUED | OUTPATIENT
Start: 2024-12-10 | End: 2024-12-11 | Stop reason: HOSPADM

## 2024-12-09 RX ORDER — NALOXONE HYDROCHLORIDE 0.4 MG/ML
0.2 INJECTION, SOLUTION INTRAMUSCULAR; INTRAVENOUS; SUBCUTANEOUS EVERY 5 MIN PRN
Status: DISCONTINUED | OUTPATIENT
Start: 2024-12-09 | End: 2024-12-11 | Stop reason: HOSPADM

## 2024-12-09 RX ORDER — ROCURONIUM BROMIDE 10 MG/ML
INJECTION, SOLUTION INTRAVENOUS AS NEEDED
Status: DISCONTINUED | OUTPATIENT
Start: 2024-12-09 | End: 2024-12-09

## 2024-12-09 RX ORDER — IBUPROFEN 200 MG
1 TABLET ORAL DAILY
Status: DISCONTINUED | OUTPATIENT
Start: 2024-12-09 | End: 2024-12-11 | Stop reason: HOSPADM

## 2024-12-09 RX ORDER — AMLODIPINE BESYLATE 5 MG/1
5 TABLET ORAL 2 TIMES DAILY
Status: DISCONTINUED | OUTPATIENT
Start: 2024-12-09 | End: 2024-12-11 | Stop reason: HOSPADM

## 2024-12-09 RX ORDER — ONDANSETRON 4 MG/1
4 TABLET, FILM COATED ORAL EVERY 8 HOURS PRN
Status: DISCONTINUED | OUTPATIENT
Start: 2024-12-09 | End: 2024-12-11 | Stop reason: HOSPADM

## 2024-12-09 RX ORDER — OXYCODONE HYDROCHLORIDE 5 MG/1
5 TABLET ORAL EVERY 4 HOURS PRN
Status: DISCONTINUED | OUTPATIENT
Start: 2024-12-09 | End: 2024-12-11 | Stop reason: HOSPADM

## 2024-12-09 RX ORDER — CYCLOBENZAPRINE HCL 10 MG
10 TABLET ORAL 3 TIMES DAILY PRN
Status: DISCONTINUED | OUTPATIENT
Start: 2024-12-09 | End: 2024-12-11 | Stop reason: HOSPADM

## 2024-12-09 RX ORDER — PHENYLEPHRINE 10 MG/250 ML(40 MCG/ML)IN 0.9 % SOD.CHLORIDE INTRAVENOUS
CONTINUOUS PRN
Status: DISCONTINUED | OUTPATIENT
Start: 2024-12-09 | End: 2024-12-09

## 2024-12-09 RX ORDER — FENTANYL CITRATE 50 UG/ML
INJECTION, SOLUTION INTRAMUSCULAR; INTRAVENOUS AS NEEDED
Status: DISCONTINUED | OUTPATIENT
Start: 2024-12-09 | End: 2024-12-09

## 2024-12-09 RX ORDER — HYDROMORPHONE HYDROCHLORIDE 1 MG/ML
1 INJECTION, SOLUTION INTRAMUSCULAR; INTRAVENOUS; SUBCUTANEOUS EVERY 5 MIN PRN
Status: DISCONTINUED | OUTPATIENT
Start: 2024-12-09 | End: 2024-12-09 | Stop reason: HOSPADM

## 2024-12-09 RX ORDER — MIDAZOLAM HYDROCHLORIDE 1 MG/ML
1 INJECTION, SOLUTION INTRAMUSCULAR; INTRAVENOUS ONCE AS NEEDED
Status: DISCONTINUED | OUTPATIENT
Start: 2024-12-09 | End: 2024-12-09 | Stop reason: HOSPADM

## 2024-12-09 RX ORDER — CEFAZOLIN SODIUM 2 G/100ML
2 INJECTION, SOLUTION INTRAVENOUS EVERY 8 HOURS
Status: COMPLETED | OUTPATIENT
Start: 2024-12-09 | End: 2024-12-10

## 2024-12-09 RX ORDER — MORPHINE SULFATE 2 MG/ML
2 INJECTION, SOLUTION INTRAMUSCULAR; INTRAVENOUS EVERY 2 HOUR PRN
Status: DISCONTINUED | OUTPATIENT
Start: 2024-12-09 | End: 2024-12-11 | Stop reason: HOSPADM

## 2024-12-09 RX ORDER — METOPROLOL SUCCINATE 25 MG/1
25 TABLET, EXTENDED RELEASE ORAL DAILY
Status: DISCONTINUED | OUTPATIENT
Start: 2024-12-09 | End: 2024-12-11 | Stop reason: HOSPADM

## 2024-12-09 RX ORDER — CELECOXIB 200 MG/1
200 CAPSULE ORAL ONCE
Status: COMPLETED | OUTPATIENT
Start: 2024-12-09 | End: 2024-12-09

## 2024-12-09 RX ORDER — DEXTROSE 50 % IN WATER (D50W) INTRAVENOUS SYRINGE
25
Status: DISCONTINUED | OUTPATIENT
Start: 2024-12-09 | End: 2024-12-11 | Stop reason: HOSPADM

## 2024-12-09 RX ORDER — HYDRALAZINE HYDROCHLORIDE 20 MG/ML
5 INJECTION INTRAMUSCULAR; INTRAVENOUS EVERY 30 MIN PRN
Status: DISCONTINUED | OUTPATIENT
Start: 2024-12-09 | End: 2024-12-09 | Stop reason: HOSPADM

## 2024-12-09 RX ORDER — CEFAZOLIN SODIUM 2 G/100ML
2 INJECTION, SOLUTION INTRAVENOUS ONCE
Status: COMPLETED | OUTPATIENT
Start: 2024-12-09 | End: 2024-12-09

## 2024-12-09 RX ORDER — POVIDONE-IODINE 5 %
SOLUTION, NON-ORAL OPHTHALMIC (EYE) AS NEEDED
Status: DISCONTINUED | OUTPATIENT
Start: 2024-12-09 | End: 2024-12-09 | Stop reason: HOSPADM

## 2024-12-09 RX ORDER — ACETAMINOPHEN 325 MG/1
650 TABLET ORAL ONCE
Status: COMPLETED | OUTPATIENT
Start: 2024-12-09 | End: 2024-12-09

## 2024-12-09 RX ORDER — LIDOCAINE HYDROCHLORIDE 20 MG/ML
JELLY TOPICAL AS NEEDED
Status: DISCONTINUED | OUTPATIENT
Start: 2024-12-09 | End: 2024-12-09 | Stop reason: HOSPADM

## 2024-12-09 RX ORDER — ACETAMINOPHEN 325 MG/1
975 TABLET ORAL ONCE
Status: DISCONTINUED | OUTPATIENT
Start: 2024-12-09 | End: 2024-12-09 | Stop reason: HOSPADM

## 2024-12-09 RX ORDER — HYDROGEN PEROXIDE 3 %
40 SOLUTION, NON-ORAL MISCELLANEOUS
COMMUNITY

## 2024-12-09 RX ORDER — LIDOCAINE HYDROCHLORIDE 10 MG/ML
0.1 INJECTION, SOLUTION INFILTRATION; PERINEURAL ONCE
Status: DISCONTINUED | OUTPATIENT
Start: 2024-12-09 | End: 2024-12-09 | Stop reason: HOSPADM

## 2024-12-09 RX ORDER — DEXTROSE 50 % IN WATER (D50W) INTRAVENOUS SYRINGE
12.5
Status: DISCONTINUED | OUTPATIENT
Start: 2024-12-09 | End: 2024-12-11 | Stop reason: HOSPADM

## 2024-12-09 RX ORDER — ONDANSETRON HYDROCHLORIDE 2 MG/ML
4 INJECTION, SOLUTION INTRAVENOUS EVERY 8 HOURS PRN
Status: DISCONTINUED | OUTPATIENT
Start: 2024-12-09 | End: 2024-12-11 | Stop reason: HOSPADM

## 2024-12-09 SDOH — ECONOMIC STABILITY: INCOME INSECURITY
IN THE PAST 12 MONTHS HAS THE ELECTRIC, GAS, OIL, OR WATER COMPANY THREATENED TO SHUT OFF SERVICES IN YOUR HOME?: ALREADY SHUT OFF

## 2024-12-09 SDOH — ECONOMIC STABILITY: FOOD INSECURITY: WITHIN THE PAST 12 MONTHS, THE FOOD YOU BOUGHT JUST DIDN'T LAST AND YOU DIDN'T HAVE MONEY TO GET MORE.: NEVER TRUE

## 2024-12-09 SDOH — SOCIAL STABILITY: SOCIAL INSECURITY: HAVE YOU HAD ANY THOUGHTS OF HARMING ANYONE ELSE?: NO

## 2024-12-09 SDOH — SOCIAL STABILITY: SOCIAL INSECURITY: HAS ANYONE EVER THREATENED TO HURT YOUR FAMILY OR YOUR PETS?: NO

## 2024-12-09 SDOH — SOCIAL STABILITY: SOCIAL INSECURITY: WITHIN THE LAST YEAR, HAVE YOU BEEN HUMILIATED OR EMOTIONALLY ABUSED IN OTHER WAYS BY YOUR PARTNER OR EX-PARTNER?: NO

## 2024-12-09 SDOH — SOCIAL STABILITY: SOCIAL INSECURITY: ARE YOU OR HAVE YOU BEEN THREATENED OR ABUSED PHYSICALLY, EMOTIONALLY, OR SEXUALLY BY ANYONE?: NO

## 2024-12-09 SDOH — SOCIAL STABILITY: SOCIAL INSECURITY
WITHIN THE LAST YEAR, HAVE YOU BEEN RAPED OR FORCED TO HAVE ANY KIND OF SEXUAL ACTIVITY BY YOUR PARTNER OR EX-PARTNER?: NO

## 2024-12-09 SDOH — SOCIAL STABILITY: SOCIAL INSECURITY: HAVE YOU HAD THOUGHTS OF HARMING ANYONE ELSE?: NO

## 2024-12-09 SDOH — SOCIAL STABILITY: SOCIAL INSECURITY: ABUSE: ADULT

## 2024-12-09 SDOH — SOCIAL STABILITY: SOCIAL INSECURITY: DO YOU FEEL ANYONE HAS EXPLOITED OR TAKEN ADVANTAGE OF YOU FINANCIALLY OR OF YOUR PERSONAL PROPERTY?: NO

## 2024-12-09 SDOH — SOCIAL STABILITY: SOCIAL INSECURITY
WITHIN THE LAST YEAR, HAVE YOU BEEN KICKED, HIT, SLAPPED, OR OTHERWISE PHYSICALLY HURT BY YOUR PARTNER OR EX-PARTNER?: NO

## 2024-12-09 SDOH — SOCIAL STABILITY: SOCIAL INSECURITY: WITHIN THE LAST YEAR, HAVE YOU BEEN AFRAID OF YOUR PARTNER OR EX-PARTNER?: NO

## 2024-12-09 SDOH — SOCIAL STABILITY: SOCIAL INSECURITY: DOES ANYONE TRY TO KEEP YOU FROM HAVING/CONTACTING OTHER FRIENDS OR DOING THINGS OUTSIDE YOUR HOME?: NO

## 2024-12-09 SDOH — SOCIAL STABILITY: SOCIAL INSECURITY: ARE THERE ANY APPARENT SIGNS OF INJURIES/BEHAVIORS THAT COULD BE RELATED TO ABUSE/NEGLECT?: NO

## 2024-12-09 SDOH — SOCIAL STABILITY: SOCIAL INSECURITY: DO YOU FEEL UNSAFE GOING BACK TO THE PLACE WHERE YOU ARE LIVING?: NO

## 2024-12-09 SDOH — ECONOMIC STABILITY: FOOD INSECURITY: WITHIN THE PAST 12 MONTHS, YOU WORRIED THAT YOUR FOOD WOULD RUN OUT BEFORE YOU GOT THE MONEY TO BUY MORE.: NEVER TRUE

## 2024-12-09 SDOH — SOCIAL STABILITY: SOCIAL INSECURITY: WERE YOU ABLE TO COMPLETE ALL THE BEHAVIORAL HEALTH SCREENINGS?: YES

## 2024-12-09 ASSESSMENT — PAIN DESCRIPTION - ORIENTATION: ORIENTATION: LOWER

## 2024-12-09 ASSESSMENT — ACTIVITIES OF DAILY LIVING (ADL)
WALKS IN HOME: INDEPENDENT
BATHING: INDEPENDENT
TOILETING: INDEPENDENT
HEARING - RIGHT EAR: FUNCTIONAL
LACK_OF_TRANSPORTATION: NO
ADEQUATE_TO_COMPLETE_ADL: YES
HEARING - LEFT EAR: FUNCTIONAL
DRESSING YOURSELF: INDEPENDENT
JUDGMENT_ADEQUATE_SAFELY_COMPLETE_DAILY_ACTIVITIES: YES
FEEDING YOURSELF: INDEPENDENT
PATIENT'S MEMORY ADEQUATE TO SAFELY COMPLETE DAILY ACTIVITIES?: YES
GROOMING: INDEPENDENT

## 2024-12-09 ASSESSMENT — COGNITIVE AND FUNCTIONAL STATUS - GENERAL
DAILY ACTIVITIY SCORE: 23
TURNING FROM BACK TO SIDE WHILE IN FLAT BAD: A LITTLE
DAILY ACTIVITIY SCORE: 23
MOVING FROM LYING ON BACK TO SITTING ON SIDE OF FLAT BED WITH BEDRAILS: A LITTLE
TURNING FROM BACK TO SIDE WHILE IN FLAT BAD: A LITTLE
DRESSING REGULAR LOWER BODY CLOTHING: A LITTLE
DRESSING REGULAR LOWER BODY CLOTHING: A LITTLE
PATIENT BASELINE BEDBOUND: NO
MOBILITY SCORE: 21
MOVING FROM LYING ON BACK TO SITTING ON SIDE OF FLAT BED WITH BEDRAILS: A LITTLE
STANDING UP FROM CHAIR USING ARMS: A LITTLE
MOBILITY SCORE: 21
STANDING UP FROM CHAIR USING ARMS: A LITTLE

## 2024-12-09 ASSESSMENT — LIFESTYLE VARIABLES
HOW OFTEN DO YOU HAVE A DRINK CONTAINING ALCOHOL: 2-3 TIMES A WEEK
HOW MANY STANDARD DRINKS CONTAINING ALCOHOL DO YOU HAVE ON A TYPICAL DAY: 3 OR 4
AUDIT-C TOTAL SCORE: 7
SKIP TO QUESTIONS 9-10: 0
HAVE YOU OR SOMEONE ELSE BEEN INJURED AS A RESULT OF YOUR DRINKING: NO
AUDIT-C TOTAL SCORE: 7
HOW OFTEN DURING THE LAST YEAR HAVE YOU NEEDED AN ALCOHOLIC DRINK FIRST THING IN THE MORNING TO GET YOURSELF GOING AFTER A NIGHT OF HEAVY DRINKING: NEVER
HOW OFTEN DURING THE LAST YEAR HAVE YOU BEEN UNABLE TO REMEMBER WHAT HAPPENED THE NIGHT BEFORE BECAUSE YOU HAD BEEN DRINKING: NEVER
HOW OFTEN DURING THE LAST YEAR HAVE YOU FOUND THAT YOU WERE NOT ABLE TO STOP DRINKING ONCE YOU HAD STARTED: NEVER
AUDIT TOTAL SCORE: 0
HOW OFTEN DO YOU HAVE 6 OR MORE DRINKS ON ONE OCCASION: WEEKLY
AUDIT TOTAL SCORE: 7
HOW OFTEN DURING THE LAST YEAR HAVE YOU HAD A FEELING OF GUILT OR REMORSE AFTER DRINKING: NEVER
HAS A RELATIVE, FRIEND, DOCTOR, OR ANOTHER HEALTH PROFESSIONAL EXPRESSED CONCERN ABOUT YOUR DRINKING OR SUGGESTED YOU CUT DOWN: NO
HOW OFTEN DURING THE LAST YEAR HAVE YOU FAILED TO DO WHAT WAS NORMALLY EXPECTED FROM YOU BECAUSE OF DRINKING: NEVER

## 2024-12-09 ASSESSMENT — PATIENT HEALTH QUESTIONNAIRE - PHQ9
2. FEELING DOWN, DEPRESSED OR HOPELESS: NOT AT ALL
1. LITTLE INTEREST OR PLEASURE IN DOING THINGS: NOT AT ALL
SUM OF ALL RESPONSES TO PHQ9 QUESTIONS 1 & 2: 0

## 2024-12-09 ASSESSMENT — PAIN SCALES - GENERAL
PAINLEVEL_OUTOF10: 6
PAINLEVEL_OUTOF10: 5 - MODERATE PAIN
PAINLEVEL_OUTOF10: 1
PAINLEVEL_OUTOF10: 0 - NO PAIN
PAINLEVEL_OUTOF10: 3
PAINLEVEL_OUTOF10: 2
PAINLEVEL_OUTOF10: 7
PAINLEVEL_OUTOF10: 3
PAINLEVEL_OUTOF10: 6

## 2024-12-09 ASSESSMENT — PAIN - FUNCTIONAL ASSESSMENT
PAIN_FUNCTIONAL_ASSESSMENT: 0-10
PAIN_FUNCTIONAL_ASSESSMENT: UNABLE TO SELF-REPORT
PAIN_FUNCTIONAL_ASSESSMENT: UNABLE TO SELF-REPORT
PAIN_FUNCTIONAL_ASSESSMENT: 0-10

## 2024-12-09 ASSESSMENT — COLUMBIA-SUICIDE SEVERITY RATING SCALE - C-SSRS
1. IN THE PAST MONTH, HAVE YOU WISHED YOU WERE DEAD OR WISHED YOU COULD GO TO SLEEP AND NOT WAKE UP?: NO
2. HAVE YOU ACTUALLY HAD ANY THOUGHTS OF KILLING YOURSELF?: NO
6. HAVE YOU EVER DONE ANYTHING, STARTED TO DO ANYTHING, OR PREPARED TO DO ANYTHING TO END YOUR LIFE?: NO

## 2024-12-09 ASSESSMENT — PAIN DESCRIPTION - LOCATION: LOCATION: BACK

## 2024-12-09 ASSESSMENT — PAIN SCALES - PAIN ASSESSMENT IN ADVANCED DEMENTIA (PAINAD): TOTALSCORE: MEDICATION (SEE MAR)

## 2024-12-09 NOTE — CARE PLAN
The patient's goals for the shift include      The clinical goals for the shift include pain control      Problem: Skin  Goal: Prevent/manage excess moisture  Flowsheets (Taken 12/9/2024 1602)  Prevent/manage excess moisture:   Moisturize dry skin   Use wicking fabric (obtain order)   Cleanse incontinence/protect with barrier cream   Monitor for/manage infection if present   Follow provider orders for dressing changes

## 2024-12-09 NOTE — OP NOTE
Preoperative diagnosis: L2-3 and L3-4 stenosis.  Prior laminectomy fusion L4-5.  Lumbar spondylosis.    Postoperative diagnosis: Above    Procedure: L2-3 and L3-4 laminectomy decompressing the L2, 3, and 4 vertebral segments.  L3-4 posterior lumbar interbody fusion.  Instrumentation L3-4.  Use of interbody cage at L3-4.  Posterior lateral fusion L2-3 and L4-5.  Exploration of spinal fusion L4-5.  Thermal ablation of the medial nerve branch supplying the facets bilaterally at L2-3, and L3-4.    Surgeon: Philip Mclaughlin M.D.    Asst.: Ryan JHAVERIThe physician assistant was present through the entire case.  Given the nature of the disease process and the procedure to be performed a skilled surgical assistant was necessary during the case.  The assistant was necessary in order to hold retractors and directly assist in the operation.  A certified scrub tech was at the back table managing instruments and supplies for the surgical case.    Anesthesia: General    Blood Loss: 250 cc    Complications: None    HPI: The patient had leg symptoms from the above described condition.  There were scheduled electively for the above-described surgery.  All of the risks, benefits, and potential complications for operative and nonoperative treatment were discussed at length with the patient.  Risks of operative intervention include, but are not limited to: Anesthesia complications, excessive blood loss, infection, damaged to uninjured structures including, but not limited to, the dural sac and nerve roots, blood clots, and lack of improvement or worsening of symptoms.  These complications could result in death, permanent disability, or need for reoperation.  The patient completely understood those risks and wished to proceed with operative intervention.    Operative implants: Globus Rise cage, Globus Leonidas pedicle screws, and Actifuse gun and putty bone graft.      Operative procedure: The patient was wheeled from the  preanesthesia care unit to the theater.  The patient was placed in supine position and all bony prominences were well-padded.  For the entire procedure anesthesia maintainined control of the patient's head neck.  General anesthesia was induced.  The patient was then placed prone on the Zackery table.  All bony prominences were well-padded.  The head and neck were in neutral position.  The back was cleansed with alcohol and spinal needles were inserted.  X-rays were taken to confirm appropriate levels.  The back was then marked and prepped and draped in normal sterile fashion.    Timeout was performed.  Site verification marking was verified.  Appropriate antibiotic administration was confirmed.  Next, a longitudinal incision in the midline was performed over the operative levels.  Dissection was carried down with a knife through the skin.  A Bovie was then used to dissect down to identify the spinous process of the operative levels.  Bovie was used to move the soft tissues off the spinous process lamina and facet bilaterally.  Clamps were then placed in the spinous processes and x-rays were taken confirm the appropriate levels.  Bovie and FloSeal was used to maintain hemostasis.  Bovie was then used to expose the lateral gutters bilaterally from the cephalad transverse process to the caudal level.  This dissection exposed the lateral facets as well as the transverse process.The Bovie was used to perform thermal ablation of the medial nerve branch at each operative level facet bilaterally using direct visualization in the anatomic area of the location of the medial nerve branch.   Ray-Nadine's were tucked in the lateral gutters to be removed later.    A rongeur was used to remove the spinous process above the L4-5 intraspinal device.  After the device was clearly skeletonized and visualized it was stressed and it was found to have a fusion very solid at that level.  This exploration of spinal fusion confirmed that it  would not be necessary to remove the interspinous process spacer and it would not be necessary to instrument at this level.    A rongeur was then used to remove the spinous processes and debulk the lamina of the L2-3 and L3-4 levels.  Microscope was then brought in for use. Next, the bur was used to debulk the lamina at the operative levels. This exposed ligamentum flavum along the midline. 60 curved curette was then used to reach underneath the lamina in the most cephalad portion of the dissection. 2 and 3 Kerrisons were used to perform midline laminectomy heading from cephalad to caudal to the most caudal level. At this point there was complete central decompression lifting off the central lamina and ligamentum flavum. The ultrasonic scalpel was then used to widen the laminectomy, first on the patient's left side and then on the patient's right side. Bone was lifted off after the ultrasonic scalpel bone cuts were made. 2 and 3 Kerrisons were then used to clean up the lateral gutters and performed foraminotomies at all levels.  At this time is complete decompression centrally as well as bilateral foraminally from the L2 nerve root to the L4 nerve root bilaterally.  Hemostasis was maintained using bipolar cautery and FloSeal.    Next, the L3-4 disc space was prepared for interbody cage placement.  A bur was used to widen the laminectomy on the patient's right side.  This laminectomy was necessary in order to follow the L3 nerve root out laterally as the III nerve root had significant impingement from a herniated disc intraforaminal he and laterally.  This disc was removed with a knife and pituitaries.  At this point there was complete decompression of the L3 nerve root in the intraforaminal and lateral region on the right.  This exposure and procedure was a distinct and separate exposure and procedure as was needed for the laminectomy and placement of cage at this level.  This provided nice exposure of the exiting  cephalad nerve root and traversing caudal nerve root and the disc space.  Bipolar cautery was used to remove epidural veins to nicely exposed the disc.  A nerve root retractor was then placed to protect the exiting nerve root and traversing dural sac and nerve root.  The disc was box cut with a knife.  Annulus was removed with the pituitary rongeur.  Pituitaries were then used to remove disc from the disc space.  Rotating patrica were used until the appropriate amount of friction between the shaver and endplates was obtained.  Pituitary rongeurs were used again to remove disc.  Next curved curettes were used to completely remove disc off of the endplates down to the annulus anteriorly and across the contralateral side.  Up-biting and backbiting and straight biting pituitaries were used as well.  At this point there was complete removal of disc from the disc space.  Actifuse gun was then placed in the disc space and filled the entire disc space with bone graft.  Next the cage was inserted into the disc space.  AP and lateral x-rays were taken to confirm appropriate positioning of the cage.  The cage was then expanded to appropriate tension was obtained.  Final AP lateral x-rays of the construct confirmed appropriate cage positioning and disc height restoration.    Next pedicle screws were placed in the L3 and 4 pedicles.  This was done by feeling the pedicle with a long ball, using a bur to to create a starting hole, using an awl to enter the pedicle, using the feeler probe to identify appropriate position of the awl, using Peddiguard to enter into the vertebral body through the center of the pedicle, using a feeler again, measuring the length of the screw off the feeler probe, tapping, using the feeler probe to ensure there were medial lateral cephalad caudal and ventral walls, and finally placing the screw.  This was done the same way for all screws.  [facility].   After all screws were placed spinal cord monitoring was used to measure all screws obtaining appropriate readings.  X-rays in AP and lateral projections were taken confirm appropriate screw positioning as well.  Next, locking rods were placed bilaterally traversing the tulips of the pedicle screws.  They were locked into position using the locking caps.  Final x-rays taken confirm the appropriate positioning of the cage screws and rods.    Next, the Ray-Nadine sponges in the lateral gutters were removed and Actifuse putty bone graft was placed from the L2 transverse process to the L3 transverse process and from the L4 transverse process to the L5 transverse process in the lateral gutter bilaterally with bone graft contacting the lateral facets and transverse process at all levels. locally prepared autograft bone from the remove lamina was also placed in the lateral gutter to augment the fusion.  Posterolateral fusion was performed at the L4-5 level as a belt and suspenders back up in case there was a pseudoarthrosis at the L4-5 level.  There was no prior bone graft in the gutters at L4-5.    The entire wound was irrigated with copious amounts of normal saline.  All saline was suctioned dry.  Valsalva was performed and there was no evidence of any dural leak or excessive bleeding.  Retractors were removed.  The deep fascia was closed over a drain that was not resting on the neural elements.  The fascia was closed with a running #1 Stratafix PDS suture.  The fatty layer was closed with 0 Vicryl suture.  The skin was closed with 2-0 Vicryl and staples.  A sterile dressing was applied.  There were no abnormal spinal cord monitor readings for the entire case.  The patient tolerated the procedure well and had pink and warm toes at the conclusion of the case.        This dictation was created using voice recognition software.  If there are any questions about inaccuracies please do not hesitate to contact me.

## 2024-12-09 NOTE — CONSULTS
"Inpatient consult to Medicine  Consult performed by: rByce Prakash MD  Consult ordered by: Ryan Jones PA-C        Reason For Consult  \"Medical management\"    History Of Present Illness  Damián Gutiérrez is a 54 y.o. male presenting for elective L2-L3 and L3-L4 laminectomy with interbody fusion and removal of hardware L4-L5.  Patient seen postoperatively.  He has no complaints at this time.  Mahan remains in.  Patient has not yet attempted to eat or drink.  His pain is well-controlled.  He has no complaints at this time including no chest pain shortness of breath nausea vomiting diarrhea constipation fever chills dysuria or headache.  He does state that he gets bad GERD like symptoms early in the morning and wants to make sure that his to Prilosec tablets, in the morning.  He is also an active smoker.     Past Medical History  He has a past medical history of Coronary artery disease, Diabetes mellitus (Multi), GERD (gastroesophageal reflux disease), Hyperlipidemia, Hypertension, Personal history of other diseases of the circulatory system, Personal history of other endocrine, nutritional and metabolic disease, Personal history of other endocrine, nutritional and metabolic disease, and Personal history of other endocrine, nutritional and metabolic disease.    Surgical History  He has a past surgical history that includes Tonsillectomy (01/04/2018); Back surgery (01/04/2018); Other surgical history (12/01/2021); Other surgical history (12/01/2021); Other surgical history (12/01/2021); Other surgical history (12/01/2021); CT angio coronary art with heartflow if score >30% (1/5/2024); and Cardiac catheterization (Left, 2/23/2024).     Social History  He reports that he has been smoking cigarettes. He started smoking about 41 years ago. He has a 71.9 pack-year smoking history. He has never used smokeless tobacco. He reports current alcohol use. He reports current drug use. Drug: Marijuana.    Family " History  Family History   Problem Relation Name Age of Onset    Other (brain hemorrhage) Mother      Heart failure Father      Heart attack Brother          Allergies  Shellfish derived    Review of Systems  11 systems reviewed and negative except for HPI above     Physical Exam  Physical Exam  Constitutional:       General: He is not in acute distress.     Appearance: Normal appearance.   HENT:      Head: Normocephalic and atraumatic.      Mouth/Throat:      Mouth: Mucous membranes are moist.   Eyes:      Extraocular Movements: Extraocular movements intact.      Conjunctiva/sclera: Conjunctivae normal.   Cardiovascular:      Rate and Rhythm: Normal rate and regular rhythm.      Heart sounds: Normal heart sounds.   Pulmonary:      Effort: Pulmonary effort is normal.      Breath sounds: Normal breath sounds. No wheezing, rhonchi or rales.   Abdominal:      General: Abdomen is flat. Bowel sounds are normal.      Palpations: Abdomen is soft.   Musculoskeletal:         General: No swelling or tenderness.      Cervical back: Normal range of motion and neck supple.      Comments: Lower extremity range of motion limited secondary to pain; lumbar SOMMER drain x 2 with bright red blood in it   Skin:     General: Skin is warm and dry.      Findings: No rash.   Neurological:      General: No focal deficit present.      Mental Status: He is alert and oriented to person, place, and time.      Cranial Nerves: No cranial nerve deficit.      Sensory: No sensory deficit.      Comments: Neurovascularly intact distal to surgical site   Psychiatric:         Mood and Affect: Mood normal.         Behavior: Behavior normal.             Last Recorded Vitals  /84 (BP Location: Right arm, Patient Position: Lying)   Pulse 75   Temp 36.6 °C (97.9 °F) (Temporal)   Resp 18   Wt 70.1 kg (154 lb 8 oz)   SpO2 97%     Relevant Results  Scheduled medications  acetaminophen, 650 mg, oral, q6h  ceFAZolin, 2 g, intravenous, q8h  nicotine, 1  patch, transdermal, Daily  oxygen, , inhalation, Continuous - 02/gases  polyethylene glycol, 17 g, oral, Daily  tranexamic acid, 1,950 mg, oral, Once      Continuous medications  sodium chloride 0.9%, 100 mL/hr      PRN medications  PRN medications: cyclobenzaprine, dextrose, dextrose, glucagon, glucagon, morphine, naloxone, ondansetron **OR** ondansetron, oxyCODONE, oxyCODONE, oxyCODONE    Results for orders placed or performed during the hospital encounter of 12/09/24 (from the past 24 hours)   POCT GLUCOSE   Result Value Ref Range    POCT Glucose 131 (H) 74 - 99 mg/dL   Type And Screen   Result Value Ref Range    ABO TYPE A     Rh TYPE NEG     ANTIBODY SCREEN NEG      FL fluoro images no charge    Result Date: 12/9/2024  These images are not reportable by radiology and will not be interpreted by  Radiologists.    ECG 12 Lead    Result Date: 11/26/2024  Sinus bradycardia Incomplete right bundle branch block Borderline ECG When compared with ECG of 23-FEB-2024 08:11, No significant change was found Confirmed by Nii [place] ([id number]) on 11/26/2024 4:26:24 PM        Assessment/Plan     54-year-old male with lumbar spinal stenosis with radiculopathy/sciatica status post laminectomy and hardware removal and overall hemodynamic stable condition.    -Surgical management including postoperative pain per primary team  -Blood pressure stable; can continue home medications (amlodipine, metoprolol, lisinopril)  -Hyperlipidemia stable; can continue ezetimibe; can resume aspirin when okay with primary team  -For GERD can resume Nexium  -For his diabetes primary team has ordered sliding scale insulin; okay with this; not on any medications at home for this; recommend diabetic diet  -Patient is a smoker; nicotine patch prescribed during hospitalization    No further medical management needs identified by myself, patient or his nurse.  I will sign off at this time.    Bryce Prakash MD

## 2024-12-09 NOTE — DISCHARGE INSTR - ACTIVITY
Call Dr. Mclaughlin for any problems and/or concerns.  *Maintain spine precautions  *Log roll as instructed  *Walk as much as possible  *Avoid pushing, pulling, bending, twisting, and sitting/laying down in the same position for long periods of time  *No baths, hot tubs, or pools until cleared by physician; no lotions, creams, ointments over incision  *You may shower, do not soak or scrub incision; pat dry  *Continue the coughing and deep breathing exercises that you learned in the hospital  *Do not engage in sports, heavy work or lifting  *If you have a brace/collar-wear as instructed by physician and/or therapy, keep the brace/collar clean and dry, check the skin around the brace/collar every day and notify your physician of any concerns    Call Doctor right away for:  *Fever above 100.4/shaking chills  *New pain, weakness, warmth, or numbness in your legs  *Foot, ankle, or calf swelling that is not relieved by elevating your feet  *Inability to urinate/move bowels  *A severe headache  *Chest pain/shortness of breath-call 911  *Difficulty swallowing (cervical surgery)    If your doctor has ordered compression stockings, wear as directed as they help to prevent blood clots and reduce swelling in your legs    Do not use any products that contain nicotine or tobacco, such as cigarettes, e-cigarettes, and chewing tobacco. These can delay bone healing after surgery. If you need help quitting, ask your healthcare provider    -No heavy lifting or straining until patient is seen in the office.  -Encourage ambulation at least 3 times a day.  -No formal physical therapy until ordered by Dr. Mclaughlin.  -Follow-up in the office in 2 weeks.  Appointment should already be made.  -You must be accompanied by a responsible adult upon discharge and for 24 hours after surgery.  Do not drive a motor vehicle, operate machinery, power tools or appliances, drink alcoholic beverages, or make critical decisions for 24 hours and while on  narcotic pain meds.  -Be aware of dizziness, which may cause a fall.  Change positions slowly.  -You may resume your regular diet, but do so slowly.  -Use your pain medication prescription as prescribed by your physician.  If possible, take your medication with food, and drink plenty of fluids.  -Call your surgeon if you have questions, temperature of 101° or greater, increased bleeding swelling or pain, drainage from the incision or increased redness around the incision.  -Call [phone] with any questions or concerns.

## 2024-12-09 NOTE — ANESTHESIA POSTPROCEDURE EVALUATION
Patient: Damián Gutiérrez    Procedure Summary       Date: 12/09/24 Room / Location: [facility] OR 06 / [facility]    Anesthesia Start: 0828 Anesthesia Stop: 1303    Procedure: L2-3 L3-4 LAMINECTOMY, L3-4 L4-5 INSTRUMNENATION, L3-4 POSTERIOR INTERBODY FUSION, L2-3 L4-5 POSTERIOR LATERAL FUSION, REMOVAL HARDWARE L4-5, CELL SAVER-notified (Spine Lumbar) Diagnosis:       Spinal stenosis, lumbar region without neurogenic claudication      Arthrodesis status      (Spinal stenosis, lumbar region without neurogenic claudication [M48.061])      (Arthrodesis status [Z98.1])    Surgeons: Philip Mclaughlin MD Responsible Provider: Raj Tenorio DO    Anesthesia Type: general ASA Status: 2            Anesthesia Type: general    Vitals Value Taken Time   /60 12/09/24 1315   Temp 36.3 °C (97.3 °F) 12/09/24 1302   Pulse 74 12/09/24 1326   Resp 22 12/09/24 1326   SpO2 99 % 12/09/24 1326   Vitals shown include unfiled device data.    Anesthesia Post Evaluation    Patient location during evaluation: PACU  Patient participation: complete - patient participated  Level of consciousness: awake and alert  Pain management: adequate  Airway patency: patent  Cardiovascular status: acceptable  Respiratory status: acceptable  Hydration status: acceptable  Postoperative Nausea and Vomiting: none        No notable events documented.

## 2024-12-09 NOTE — ANESTHESIA PROCEDURE NOTES
Arterial Line:    Date/Time: 12/9/2024 8:45 AM    Staffing  Performed: CRNA   Authorized by: Raj Tenorio DO    Performed by: ADALID Warner-CRNA    An arterial line was placed. Procedure performed using surface landmarks.in the OR for the following indication(s): continuous blood pressure monitoring and blood sampling needed.    A  (size) (length), Angiocath (type) catheter was placed into the Left radial artery, secured by Tegaderm,   Seldinger technique not used.  Events:  patient tolerated procedure well with no complications.

## 2024-12-09 NOTE — ANESTHESIA PROCEDURE NOTES
Airway  Date/Time: 12/9/2024 8:41 AM  Urgency: elective    Airway not difficult    Staffing  Performed: CRNA and attending   Authorized by: Raj Tenorio DO    Performed by: ADALID Warner-MONY  Patient location during procedure: OR    Indications and Patient Condition  Indications for airway management: anesthesia  Spontaneous ventilation: present  Sedation level: deep  Preoxygenated: yes  Patient position: sniffing  MILS maintained throughout  Mask difficulty assessment: 1 - vent by mask    Final Airway Details  Final airway type: endotracheal airway      Successful airway: ETT  Cuffed: yes   Successful intubation technique: video laryngoscopy  Facilitating devices/methods: intubating stylet  Endotracheal tube insertion site: oral  Blade: Layo  Blade size: #4  ETT size (mm): 7.0  Cormack-Lehane Classification: grade IIa - partial view of glottis  Placement verified by: chest auscultation and capnometry   Measured from: lips  Number of attempts at approach: 1  Ventilation between attempts: none  Number of other approaches attempted: 0

## 2024-12-09 NOTE — ANESTHESIA PREPROCEDURE EVALUATION
Patient: Damián Gutiérrez    Procedure Information       Date/Time: 12/09/24 0830    Procedure: L2-3 L3-4 LAMINECTOMY, L3-4 L4-5 INSTRUMNENATION, L3-4 POSTERIOR INTERBODY FUSION, L2-3 L4-5 POSTERIOR LATERAL FUSION, REMOVAL HARDWARE L4-5, CELL SAVER-notified (Spine Lumbar)    Location: [facility] OR 06 / [facility]    Surgeons: Philip Mclaughlin MD            Relevant Problems   Cardiac   (+) Hyperlipidemia   (+) Hypertension associated with type 2 diabetes mellitus (Multi)      Neuro   (+) Back pain of lumbar region with sciatica      GI   (+) GERD (gastroesophageal reflux disease)      Endocrine   (+) Diabetes mellitus, type II (Multi)      Musculoskeletal   (+) Spinal stenosis, lumbar region without neurogenic claudication      Skin   (+) Eczema       Clinical information reviewed:   Tobacco  Allergies  Meds   Med Hx  Surg Hx   Fam Hx  Soc Hx        NPO Detail:  NPO/Void Status  Date of Last Liquid: 12/08/24  Time of Last Liquid: 2130  Date of Last Solid: 12/08/24  Time of Last Solid: 2030  Last Intake Type: Clear fluids  Time of Last Void: 0415         Physical Exam    Airway  Mallampati: II  TM distance: >3 FB  Neck ROM: full     Cardiovascular - normal exam     Dental    Pulmonary - normal exam     Abdominal - normal exam             Anesthesia Plan    History of general anesthesia?: yes  History of complications of general anesthesia?: no    ASA 2     general     intravenous induction   Anesthetic plan and risks discussed with patient.    Plan discussed with CRNA.

## 2024-12-09 NOTE — ANESTHESIA PROCEDURE NOTES
Peripheral IV  Date/Time: 12/9/2024 9:07 AM      Placement  Needle size: 18 G  Laterality: left  Location: hand  Site prep: alcohol  Technique: anatomical landmarks  Attempts: 1

## 2024-12-10 LAB
ANION GAP SERPL CALC-SCNC: 12 MMOL/L (ref 10–20)
BUN SERPL-MCNC: 12 MG/DL (ref 6–23)
CALCIUM SERPL-MCNC: 8.8 MG/DL (ref 8.6–10.3)
CHLORIDE SERPL-SCNC: 103 MMOL/L (ref 98–107)
CO2 SERPL-SCNC: 26 MMOL/L (ref 21–32)
CREAT SERPL-MCNC: 0.73 MG/DL (ref 0.5–1.3)
EGFRCR SERPLBLD CKD-EPI 2021: >90 ML/MIN/1.73M*2
ERYTHROCYTE [DISTWIDTH] IN BLOOD BY AUTOMATED COUNT: 12.3 % (ref 11.5–14.5)
GLUCOSE SERPL-MCNC: 134 MG/DL (ref 74–99)
HCT VFR BLD AUTO: 40.4 % (ref 41–52)
HGB BLD-MCNC: 13.3 G/DL (ref 13.5–17.5)
MCH RBC QN AUTO: 29.3 PG (ref 26–34)
MCHC RBC AUTO-ENTMCNC: 32.9 G/DL (ref 32–36)
MCV RBC AUTO: 89 FL (ref 80–100)
NRBC BLD-RTO: 0 /100 WBCS (ref 0–0)
PLATELET # BLD AUTO: 192 X10*3/UL (ref 150–450)
POTASSIUM SERPL-SCNC: 4.2 MMOL/L (ref 3.5–5.3)
RBC # BLD AUTO: 4.54 X10*6/UL (ref 4.5–5.9)
SODIUM SERPL-SCNC: 137 MMOL/L (ref 136–145)
WBC # BLD AUTO: 14.2 X10*3/UL (ref 4.4–11.3)

## 2024-12-10 PROCEDURE — 2500000004 HC RX 250 GENERAL PHARMACY W/ HCPCS (ALT 636 FOR OP/ED): Performed by: PHYSICIAN ASSISTANT

## 2024-12-10 PROCEDURE — 2500000001 HC RX 250 WO HCPCS SELF ADMINISTERED DRUGS (ALT 637 FOR MEDICARE OP): Performed by: PHYSICIAN ASSISTANT

## 2024-12-10 PROCEDURE — 99231 SBSQ HOSP IP/OBS SF/LOW 25: CPT | Performed by: PHYSICIAN ASSISTANT

## 2024-12-10 PROCEDURE — 85027 COMPLETE CBC AUTOMATED: CPT | Performed by: PHYSICIAN ASSISTANT

## 2024-12-10 PROCEDURE — S4991 NICOTINE PATCH NONLEGEND: HCPCS | Performed by: INTERNAL MEDICINE

## 2024-12-10 PROCEDURE — 2500000004 HC RX 250 GENERAL PHARMACY W/ HCPCS (ALT 636 FOR OP/ED): Mod: JZ | Performed by: PHYSICIAN ASSISTANT

## 2024-12-10 PROCEDURE — 2500000002 HC RX 250 W HCPCS SELF ADMINISTERED DRUGS (ALT 637 FOR MEDICARE OP, ALT 636 FOR OP/ED): Performed by: PHYSICIAN ASSISTANT

## 2024-12-10 PROCEDURE — 1100000001 HC PRIVATE ROOM DAILY

## 2024-12-10 PROCEDURE — 97165 OT EVAL LOW COMPLEX 30 MIN: CPT | Mod: GO | Performed by: OCCUPATIONAL THERAPIST

## 2024-12-10 PROCEDURE — 36415 COLL VENOUS BLD VENIPUNCTURE: CPT | Performed by: PHYSICIAN ASSISTANT

## 2024-12-10 PROCEDURE — 80048 BASIC METABOLIC PNL TOTAL CA: CPT | Performed by: PHYSICIAN ASSISTANT

## 2024-12-10 PROCEDURE — 2500000002 HC RX 250 W HCPCS SELF ADMINISTERED DRUGS (ALT 637 FOR MEDICARE OP, ALT 636 FOR OP/ED): Performed by: INTERNAL MEDICINE

## 2024-12-10 PROCEDURE — 97161 PT EVAL LOW COMPLEX 20 MIN: CPT | Mod: GP

## 2024-12-10 RX ORDER — CEFAZOLIN SODIUM 2 G/100ML
2 INJECTION, SOLUTION INTRAVENOUS EVERY 8 HOURS
Status: DISCONTINUED | OUTPATIENT
Start: 2024-12-10 | End: 2024-12-11

## 2024-12-10 ASSESSMENT — PAIN SCALES - GENERAL
PAINLEVEL_OUTOF10: 3
PAINLEVEL_OUTOF10: 5 - MODERATE PAIN
PAINLEVEL_OUTOF10: 5 - MODERATE PAIN
PAINLEVEL_OUTOF10: 0 - NO PAIN
PAINLEVEL_OUTOF10: 4
PAINLEVEL_OUTOF10: 4
PAINLEVEL_OUTOF10: 0 - NO PAIN
PAINLEVEL_OUTOF10: 6
PAINLEVEL_OUTOF10: 5 - MODERATE PAIN
PAINLEVEL_OUTOF10: 5 - MODERATE PAIN
PAINLEVEL_OUTOF10: 2
PAINLEVEL_OUTOF10: 2
PAINLEVEL_OUTOF10: 3

## 2024-12-10 ASSESSMENT — PAIN DESCRIPTION - ORIENTATION
ORIENTATION: LOWER

## 2024-12-10 ASSESSMENT — PAIN DESCRIPTION - LOCATION
LOCATION: BACK

## 2024-12-10 ASSESSMENT — COGNITIVE AND FUNCTIONAL STATUS - GENERAL
STANDING UP FROM CHAIR USING ARMS: A LITTLE
PERSONAL GROOMING: A LITTLE
TURNING FROM BACK TO SIDE WHILE IN FLAT BAD: A LITTLE
DAILY ACTIVITIY SCORE: 20
HELP NEEDED FOR BATHING: A LITTLE
MOVING FROM LYING ON BACK TO SITTING ON SIDE OF FLAT BED WITH BEDRAILS: A LITTLE
DRESSING REGULAR LOWER BODY CLOTHING: A LITTLE
WALKING IN HOSPITAL ROOM: A LITTLE
MOVING TO AND FROM BED TO CHAIR: A LITTLE
TOILETING: A LITTLE
MOBILITY SCORE: 18
CLIMB 3 TO 5 STEPS WITH RAILING: A LITTLE

## 2024-12-10 ASSESSMENT — PAIN SCALES - PAIN ASSESSMENT IN ADVANCED DEMENTIA (PAINAD)
TOTALSCORE: MEDICATION (SEE MAR)

## 2024-12-10 ASSESSMENT — PAIN - FUNCTIONAL ASSESSMENT
PAIN_FUNCTIONAL_ASSESSMENT: 0-10

## 2024-12-10 ASSESSMENT — ACTIVITIES OF DAILY LIVING (ADL): ADL_ASSISTANCE: INDEPENDENT

## 2024-12-10 NOTE — CARE PLAN
The patient's goals for the shift include      The clinical goals for the shift include pt will demonstrate comfort aeb sleeping in intervals of 3 hours or greater by end of this shift    Over the shift, the patient did make progress toward the following goals. Pt is alert and oriented x 3 rating pain as high as a 5 pt has received schedule tylenol and oxy 5mg as needed which has been effective, pt has been up , with 1 person assist, gait belt and walker ambulated in hallway,  has been able to do exercises, I.s. and log rolling , pt has been able to sleep in long intervals, make needs known  use ncl and remained safe, bed alarm is on,

## 2024-12-10 NOTE — PROGRESS NOTES
12/10/24 0933   Discharge Planning   Living Arrangements Spouse/significant other   Support Systems Spouse/significant other   Type of Residence Private residence   Home or Post Acute Services None   Expected Discharge Disposition Home     Met with patient at bedside. Admitted for lumbar laminectomy. Pt lives with spouse and was independent PTA with no HHC. Pt has a walker. PCP is Dr Lin. Pt feels he is able to manage his health and understands his medications. Was able to drive and obtain meds. Therapy evals pending. Pt plans to return home with no new discharge needs. Family will provide transport.

## 2024-12-10 NOTE — PROGRESS NOTES
"Damián Gutiérrez is a 54 y.o. male on day 1 of admission presenting with Back pain of lumbar region with sciatica.    Subjective   Mr. Gutiérrez notes some improvement since surgery in his right LE numbness. He has voided since torres was removed early this morning.  He describes mild to moderate lumbar discomfort.  He is looking forward to getting up and working with therapy this morning.       Objective     Physical Exam  Vitals reviewed.   HENT:      Head: Normocephalic.      Mouth/Throat:      Mouth: Mucous membranes are moist.      Pharynx: Oropharynx is clear.   Eyes:      Extraocular Movements: Extraocular movements intact.   Cardiovascular:      Rate and Rhythm: Normal rate.   Pulmonary:      Effort: Pulmonary effort is normal.   Abdominal:      Palpations: Abdomen is soft.   Musculoskeletal:      Comments: Lumbar dressing is dry and intact. Drain #1 with 120ml output overnight, Drain #2 with 15ml output overnight,  light touch sensation is intact, ankle dorsiflexion/plantar flexion is intact. DP 2+/2 palpable     Skin:     General: Skin is warm and dry.      Capillary Refill: Capillary refill takes less than 2 seconds.   Neurological:      General: No focal deficit present.      Mental Status: He is alert. Mental status is at baseline.   Psychiatric:         Mood and Affect: Mood normal.         Last Recorded Vitals  Blood pressure 134/75, pulse 76, temperature 36.7 °C (98.1 °F), temperature source Temporal, resp. rate 16, height 1.626 m (5' 4\"), weight 70.1 kg (154 lb 8 oz), SpO2 96%.  Intake/Output last 3 Shifts:  I/O last 3 completed shifts:  In: 4760 (67.9 mL/kg) [P.O.:1320; I.V.:1340 (19.1 mL/kg); IV Piggyback:2100]  Out: 5060 (72.2 mL/kg) [Urine:4425 (1.8 mL/kg/hr); Drains:285; Blood:350]  Weight: 70.1 kg     Relevant Results      Scheduled medications  acetaminophen, 650 mg, oral, q6h  amLODIPine, 5 mg, oral, BID  ezetimibe, 10 mg, oral, Daily  metoprolol succinate XL, 25 mg, oral, Daily  nicotine, " 1 patch, transdermal, Daily  oxygen, , inhalation, Continuous - 02/gases  pantoprazole, 40 mg, oral, Daily before breakfast  polyethylene glycol, 17 g, oral, Daily      Continuous medications  sodium chloride 0.9%, 100 mL/hr, Last Rate: 100 mL/hr (12/09/24 1603)      PRN medications  PRN medications: cyclobenzaprine, dextrose, dextrose, glucagon, glucagon, morphine, naloxone, ondansetron **OR** ondansetron, oxyCODONE, oxyCODONE, oxyCODONE  Results for orders placed or performed during the hospital encounter of 12/09/24 (from the past 24 hours)   POCT GLUCOSE   Result Value Ref Range    POCT Glucose 180 (H) 74 - 99 mg/dL   CBC   Result Value Ref Range    WBC 14.2 (H) 4.4 - 11.3 x10*3/uL    nRBC 0.0 0.0 - 0.0 /100 WBCs    RBC 4.54 4.50 - 5.90 x10*6/uL    Hemoglobin 13.3 (L) 13.5 - 17.5 g/dL    Hematocrit 40.4 (L) 41.0 - 52.0 %    MCV 89 80 - 100 fL    MCH 29.3 26.0 - 34.0 pg    MCHC 32.9 32.0 - 36.0 g/dL    RDW 12.3 11.5 - 14.5 %    Platelets 192 150 - 450 x10*3/uL   Basic metabolic panel   Result Value Ref Range    Glucose 134 (H) 74 - 99 mg/dL    Sodium 137 136 - 145 mmol/L    Potassium 4.2 3.5 - 5.3 mmol/L    Chloride 103 98 - 107 mmol/L    Bicarbonate 26 21 - 32 mmol/L    Anion Gap 12 10 - 20 mmol/L    Urea Nitrogen 12 6 - 23 mg/dL    Creatinine 0.73 0.50 - 1.30 mg/dL    eGFR >90 >60 mL/min/1.73m*2    Calcium 8.8 8.6 - 10.3 mg/dL               FL fluoro images no charge    Result Date: 12/9/2024  These images are not reportable by radiology and will not be interpreted by  Radiologists.    ECG 12 Lead    Result Date: 11/26/2024  Sinus bradycardia Incomplete right bundle branch block Borderline ECG When compared with ECG of 23-FEB-2024 08:11, No significant change was found Confirmed by Nii [place] ([id number]) on 11/26/2024 4:26:24 PM                Assessment/Plan   Assessment & Plan  Back pain of lumbar region with sciatica    Spinal stenosis, lumbar region without neurogenic claudication    Arthrodesis  status    POD # 1 s/p  L2-3 and L3-4 laminectomy decompressing the L2, 3, and 4 vertebral segments. L3-4 posterior lumbar interbody fusion. Instrumentation L3-4. Use of interbody cage at L3-4. Posterior lateral fusion L2-3 and L4-5. Exploration of spinal fusion L4-5. Thermal ablation of the medial nerve branch supplying the facets bilaterally at L2-3, and L3-4.   PT/OT, WBAT B LE, wear lumbar brace for ambulation of distances greater than 10 feet  Drains x 2 to remain in place, continue to monitor drainage, Dr. Mclaughlin to determine time of removal; continue IV antibiotics while drains are present per Dr. Mclaughlin  Multimodal pain regimen  Bowel regimen  Surgical dressing to be removed  12/12  Labwork reviewed  VTE prophylaxis: WILY stockings, SCDs, early ambulation  Disp: home   Follow up with Dr. Mclaughlin as directed         I spent 25 minutes in the professional and overall care of this patient.      Jolie Painter PA-C

## 2024-12-10 NOTE — PROGRESS NOTES
Occupational Therapy    Evaluation    Patient Name: Damián Gutiérrez  MRN: [id number]  Today's Date: 12/10/2024  Time Calculation  Start Time: 1105  Stop Time: 1126  Time Calculation (min): 21 min  4110/4110-A  Eval only     Assessment  IP OT Assessment  Prognosis: Good  End of Session Patient Position: Up in chair, Alarm on  Patient presents with decline in ADLs, functional transfers, functional mobility and would benefit from OT during acute stay to improve functional independence and safety. Recommend low intensity OT to maximize functional independence and safety.     Plan:  Treatment Interventions: ADL retraining, Functional transfer training, Patient/family training, Equipment evaluation/education, Compensatory technique education  OT Frequency: Daily  OT Discharge Recommendations: Low intensity level of continued care  Equipment Recommended upon Discharge: Wheeled walker (shower chair, reacher, sock aide, LH shoe horn)  OT - OK to Discharge: Yes from acute care OT services to the next level of care when cleared by medical team      Subjective   Current Problem:  1. Back pain of lumbar region with sciatica        2. Spinal stenosis, lumbar region without neurogenic claudication  Surgical Pathology Exam    Surgical Pathology Exam      3. Arthrodesis status  Surgical Pathology Exam    Surgical Pathology Exam          General:  General  Reason for Referral: recent spine surgery  Referred By: Philip Mclaughlin MD  Past Medical History Relevant to Rehab: Admitted 12/9/2024 after having the following completed by Dr Goodwin: L2-3 L3-4 LAMINECTOMY, L3-4 L4-5 INSTRUMNENATION, L3-4 POSTERIOR INTERBODY FUSION, L2-3 L4-5 POSTERIOR LATERAL FUSION, REMOVAL HARDWARE L4-5  PMH: HTN, HLD, DM II, spinal stenosis  Co-Treatment: PT  Co-Treatment Reason: for safety  Prior to Session Communication: Bedside nurse  Patient Position Received: Bed, 3 rail up, Alarm on  Preferred Learning Style: verbal  General Comment: Patient in  long legged sitting in bed and agreeable to participate in OT evaluation.  Lines: 2 SOMMER drains    Precautions:  Medical Precautions: Fall precautions  Post-Surgical Precautions: Spinal precautions  Precautions Comment: back brace when out of bed    Pain:  Pain Assessment  Pain Assessment: 0-10  0-10 (Numeric) Pain Score: 4  Pain Type: Surgical pain  Pain Location: Back  Pain Interventions: Rest    Objective   Cognition:  Overall Cognitive Status: Within Functional Limits    Home Living:  Home Living Comments: Lives at home with spouse with a few PENNY.  Reports 1st floor setup. Has tub shower with no DME.     Prior Function:  Prior Function Comments: Was independent with all ADLs. Shared IADLs.  Independent without AD functional mobility tasks.  Owns WW    ADL:  LE Dressing Assistance: Minimal (anticipated)    Activity Tolerance:  Endurance: Endurance does not limit participation in activity    Bed Mobility/Transfers:   Bed Mobility  Bed Mobility:  (SBA supine to sit with log roll technique) Back brace donned seated at edge of bed.  Transfers  Transfer:  (SBA sit <>stand with min verbal cues for proper hand placement and technique)    Ambulation/Gait Training:  Functional Mobility  Functional Mobility Performed:  (SBA with WW functional mobility task)    Sensation:  Sensation Comment: reported right shin numbness which is new since surgery. Reports having told MD regarding this new sensation.    Extremities: RUE   RUE : Within Functional Limits and LUE   LUE: Within Functional Limits    Outcome Me[facility]  Putting on and taking off regular lower body clothing: A little  Bathing (including washing, rinsing, drying): A little  Putting on and taking off regular upper body clothing: None  Toileting, which includes using toilet, bedpan or urinal: A little  Taking care of personal grooming such as brushing teeth: A little  Eating Meals: None  Daily Activity - Total Score:  20    EDUCATION:  Education  Individual(s) Educated: Patient  Education Provided: Fall precautons (safety with transfers, spinal precautions)  Patient Response to Education: Patient/Caregiver Verbalized Understanding of Information    Goals:   Encounter Problems       Encounter Problems (Active)       Dressings Lower Extremities       STG - Patient will complete lower body dressing independently with AE and comp strategies  (Progressing)       Start:  12/10/24    Expected End:  12/24/24               Functional Balance       STG-Patient will be independent with assistive device dynamic stand task >5 minutes for ADL completion   (Progressing)       Start:  12/10/24    Expected End:  12/24/24               Functional Mobility       STG-Patient will be independent with assistive device functional mobility tasks   (Progressing)       Start:  12/10/24    Expected End:  12/24/24               OT Transfers       STG - Patient will perform car transfers independently demonstrating good safety  (Progressing)       Start:  12/10/24    Expected End:  12/24/24            STG-Patient will be independent with functional transfers demonstrating good safety   (Progressing)       Start:  12/10/24    Expected End:  12/24/24               Safety       STG-Patient will independently verbalize spine precautions with all functional tasks   (Progressing)       Start:  12/10/24    Expected End:  12/24/24

## 2024-12-10 NOTE — PROGRESS NOTES
Physical Therapy    Physical Therapy Evaluation    Patient Name: Damián Gutiérrez  MRN: [id number]  Today's Date: 12/10/2024   Time Calculation  Start Time: 1104  Stop Time: 1126  Time Calculation (min): 22 min  4110/4110-A    Assessment/Plan   PT Assessment  PT Assessment Results: Decreased strength, Decreased endurance, Impaired balance, Decreased mobility, Pain, Orthopedic restrictions, Impaired sensation  Rehab Prognosis: Good  Evaluation/Treatment Tolerance: Patient tolerated treatment well  Medical Staff Made Aware: Yes  End of Session Communication: Bedside nurse  Assessment Comment: Pt presents today below baseline level of function and requires continued PT during hospital stay. Pt requires PT at a low intensity level at discharge to maximize functional mobility and safety. Recommend pt use a FWW at all times when ambulating in order to maximize safety and stability.     End of Session Patient Position: Up in chair, Alarm on  IP OR SWING BED PT PLAN  Inpatient or Swing Bed: Inpatient  PT Plan  Treatment/Interventions: Bed mobility, Transfer training, Gait training, Balance training, Neuromuscular re-education, Strengthening, Endurance training, Range of motion, Therapeutic exercise, Therapeutic activity, Home exercise program, Stair training  PT Plan: Ongoing PT  PT Frequency: Daily  PT Discharge Recommendations: Low intensity level of continued care  Equipment Recommended upon Discharge: Wheeled walker  PT Recommended Transfer Status: Assist x1  PT - OK to Discharge: Yes (To next level of care when cleared by medical team ')    Subjective     General Visit Information:  General  Reason for Referral: recent spine surgery  Referred By: Philip Mclaughlin MD  Past Medical History Relevant to Rehab: Admitted 12/9/24 following completion of  L2-3 and L3-4 laminectomy decompressing the L2, 3, and 4 vertebral segments. L3-4 posterior lumbar interbody fusion. Instrumentation L3-4. Use of interbody cage at L3-4.  Posterior lateral fusion L2-3 and L4-5. Exploration of spinal fusion L4-5. Thermal ablation of the medial nerve branch supplying the facets bilaterally at L2-3, and L3-4. PMH: CAD, DM, HTN, left tib/fib fracture  Family/Caregiver Present: No  Co-Treatment: OT  Co-Treatment Reason: for safety  Prior to Session Communication: Bedside nurse  Patient Position Received: Bed, 3 rail up, Alarm on  Preferred Learning Style: verbal  General Comment: Pt agreeable to PT, nursing cleared for treatment.    Home Living:  Home Living  Type of Home: House  Lives With: Spouse  Home Adaptive Equipment: Walker rolling or standard  Home Layout: One level  Home Access: Stairs to enter with rails  Entrance Stairs-Number of Steps: 2  Bathroom Shower/Tub: Walk-in shower, Tub/shower unit  Bathroom Equipment: Grab bars in shower, Shower chair with back    Prior Level of Function:  Prior Function Per Pt/Caregiver Report  ADL Assistance: Independent  Homemaking Assistance: Independent  Ambulatory Assistance: Independent  Prior Function Comments: denies any falls in the past 6 months    Precautions:  Precautions  Medical Precautions: Fall precautions  Post-Surgical Precautions: Spinal precautions  Braces Applied: donned LSO with pt sitting at EOB, LSO remained donned for all mobility  Precautions Comment: LSO brace on when up, bilateral SOMMER drains       Objective     Pain:  Pain Assessment  Pain Assessment: 0-10  0-10 (Numeric) Pain Score: 4  Pain Type: Surgical pain  Pain Location: Back  Pain Interventions: Ambulation/increased activity, Repositioned    Cognition:  Cognition  Overall Cognitive Status: Within Functional Limits  Orientation Level: Oriented X4    General Assessments:      Activity Tolerance  Endurance: Endurance does not limit participation in activity  Sensation  Sensation Comment: reports numbness and tingling in right shin with ambulation              Static Sitting Balance  Static Sitting-Comment/Number of Minutes:  good  Dynamic Sitting Balance  Dynamic Sitting-Comments: good  Static Standing Balance  Static Standing-Comment/Number of Minutes: good  Dynamic Standing Balance  Dynamic Standing-Comments: fair    Functional Assessments:     Bed Mobility  Bed Mobility: Yes  Bed Mobility 1  Bed Mobility 1: Supine to sitting  Level of Assistance 1: Close supervision  Bed Mobility Comments 1: using log roll  Transfers  Transfer: Yes  Transfer 1  Transfer From 1: Sit to  Transfer to 1: Stand  Transfer Device 1: Walker  Transfer Level of Assistance 1: Close supervision  Transfers 2  Transfer From 2: Stand to  Transfer to 2: Sit  Transfer Device 2: Walker  Transfer Level of Assistance 2: Close supervision  Ambulation/Gait Training  Ambulation/Gait Training Performed: Yes  Ambulation/Gait Training 1  Device 1: Rolling walker  Gait Support Devices: Gait belt  Assistance 1: Contact guard  Quality of Gait 1: Decreased step length, Inconsistent stride length  Comments/Distance (ft) 1: 80 feet x 2          Extremity/Trunk Assessments:        RLE   RLE : Within Functional Limits (with functional movement assessment)  LLE   LLE : Within Functional Limits (with functional movement assessment)    Outcome Measures:     [facility] Basic Mobility  Turning from your back to your side while in a flat bed without using bedrails: A little  Moving from lying on your back to sitting on the side of a flat bed without using bedrails: A little  Moving to and from bed to chair (including a wheelchair): A little  Standing up from a chair using your arms (e.g. wheelchair or bedside chair): A little  To walk in hospital room: A little  Climbing 3-5 steps with railing: A little  Basic Mobility - Total Score: 18                      Goals:  Encounter Problems       Encounter Problems (Active)       PT Problem       Pt will perform bed mobility with mod I.  (Progressing)       Start:  12/10/24    Expected End:  12/24/24            Pt will complete sit <> stand and bed <>  chair transfers with mod I.  (Progressing)       Start:  12/10/24    Expected End:  12/24/24            Pt will ambulate 300 feet mod I using FWW with no significant gait deviations.   (Progressing)       Start:  12/10/24    Expected End:  12/24/24            Pt will ascend/descend at least 2 stairs SBA in order to navigate home environment.   (Progressing)       Start:  12/10/24    Expected End:  12/24/24                 Education Documentation  Precautions, taught by Kelli Lozano, PT at 12/10/2024  3:02 PM.  Learner: Patient  Readiness: Acceptance  Method: Explanation, Demonstration  Response: Verbalizes Understanding, Demonstrated Understanding    Body Mechanics, taught by Kelli Lozano, PT at 12/10/2024  3:02 PM.  Learner: Patient  Readiness: Acceptance  Method: Explanation, Demonstration  Response: Verbalizes Understanding, Demonstrated Understanding    Mobility Training, taught by Kelli Lozano, PT at 12/10/2024  3:02 PM.  Learner: Patient  Readiness: Acceptance  Method: Explanation, Demonstration  Response: Verbalizes Understanding, Demonstrated Understanding    Education Comments  No comments found.

## 2024-12-11 ENCOUNTER — PHARMACY VISIT (OUTPATIENT)
Dept: PHARMACY | Facility: CLINIC | Age: 54
End: 2024-12-11
Payer: COMMERCIAL

## 2024-12-11 VITALS
DIASTOLIC BLOOD PRESSURE: 73 MMHG | SYSTOLIC BLOOD PRESSURE: 133 MMHG | TEMPERATURE: 98.8 F | RESPIRATION RATE: 16 BRPM | HEART RATE: 96 BPM | OXYGEN SATURATION: 94 % | HEIGHT: 64 IN | WEIGHT: 154.5 LBS | BODY MASS INDEX: 26.38 KG/M2

## 2024-12-11 LAB
ANION GAP SERPL CALC-SCNC: 13 MMOL/L (ref 10–20)
BUN SERPL-MCNC: 13 MG/DL (ref 6–23)
CALCIUM SERPL-MCNC: 8.8 MG/DL (ref 8.6–10.3)
CHLORIDE SERPL-SCNC: 100 MMOL/L (ref 98–107)
CO2 SERPL-SCNC: 27 MMOL/L (ref 21–32)
CREAT SERPL-MCNC: 0.78 MG/DL (ref 0.5–1.3)
EGFRCR SERPLBLD CKD-EPI 2021: >90 ML/MIN/1.73M*2
ERYTHROCYTE [DISTWIDTH] IN BLOOD BY AUTOMATED COUNT: 12.4 % (ref 11.5–14.5)
GLUCOSE SERPL-MCNC: 121 MG/DL (ref 74–99)
HCT VFR BLD AUTO: 41.3 % (ref 41–52)
HGB BLD-MCNC: 13.6 G/DL (ref 13.5–17.5)
MCH RBC QN AUTO: 29.5 PG (ref 26–34)
MCHC RBC AUTO-ENTMCNC: 32.9 G/DL (ref 32–36)
MCV RBC AUTO: 90 FL (ref 80–100)
NRBC BLD-RTO: 0 /100 WBCS (ref 0–0)
PLATELET # BLD AUTO: 157 X10*3/UL (ref 150–450)
POTASSIUM SERPL-SCNC: 3.9 MMOL/L (ref 3.5–5.3)
RBC # BLD AUTO: 4.61 X10*6/UL (ref 4.5–5.9)
SODIUM SERPL-SCNC: 136 MMOL/L (ref 136–145)
WBC # BLD AUTO: 10.4 X10*3/UL (ref 4.4–11.3)

## 2024-12-11 PROCEDURE — 2500000002 HC RX 250 W HCPCS SELF ADMINISTERED DRUGS (ALT 637 FOR MEDICARE OP, ALT 636 FOR OP/ED): Performed by: PHYSICIAN ASSISTANT

## 2024-12-11 PROCEDURE — 99238 HOSP IP/OBS DSCHRG MGMT 30/<: CPT | Performed by: PHYSICIAN ASSISTANT

## 2024-12-11 PROCEDURE — 2500000004 HC RX 250 GENERAL PHARMACY W/ HCPCS (ALT 636 FOR OP/ED): Mod: JZ | Performed by: PHYSICIAN ASSISTANT

## 2024-12-11 PROCEDURE — 36415 COLL VENOUS BLD VENIPUNCTURE: CPT | Performed by: PHYSICIAN ASSISTANT

## 2024-12-11 PROCEDURE — 80048 BASIC METABOLIC PNL TOTAL CA: CPT | Performed by: PHYSICIAN ASSISTANT

## 2024-12-11 PROCEDURE — 2500000005 HC RX 250 GENERAL PHARMACY W/O HCPCS: Performed by: PHYSICIAN ASSISTANT

## 2024-12-11 PROCEDURE — RXMED WILLOW AMBULATORY MEDICATION CHARGE

## 2024-12-11 PROCEDURE — 2500000004 HC RX 250 GENERAL PHARMACY W/ HCPCS (ALT 636 FOR OP/ED): Performed by: PHYSICIAN ASSISTANT

## 2024-12-11 PROCEDURE — 97535 SELF CARE MNGMENT TRAINING: CPT | Mod: GO,CO

## 2024-12-11 PROCEDURE — 2500000001 HC RX 250 WO HCPCS SELF ADMINISTERED DRUGS (ALT 637 FOR MEDICARE OP): Performed by: PHYSICIAN ASSISTANT

## 2024-12-11 PROCEDURE — 97116 GAIT TRAINING THERAPY: CPT | Mod: GP,CQ

## 2024-12-11 PROCEDURE — 85027 COMPLETE CBC AUTOMATED: CPT | Performed by: PHYSICIAN ASSISTANT

## 2024-12-11 PROCEDURE — 2500000002 HC RX 250 W HCPCS SELF ADMINISTERED DRUGS (ALT 637 FOR MEDICARE OP, ALT 636 FOR OP/ED): Performed by: INTERNAL MEDICINE

## 2024-12-11 PROCEDURE — S4991 NICOTINE PATCH NONLEGEND: HCPCS | Performed by: INTERNAL MEDICINE

## 2024-12-11 RX ORDER — ACETAMINOPHEN 325 MG/1
650 TABLET ORAL EVERY 6 HOURS
Start: 2024-12-11

## 2024-12-11 RX ORDER — CYCLOBENZAPRINE HCL 10 MG
10 TABLET ORAL 3 TIMES DAILY PRN
Qty: 15 TABLET | Refills: 0 | Status: SHIPPED | OUTPATIENT
Start: 2024-12-11 | End: 2024-12-16

## 2024-12-11 RX ORDER — OXYCODONE HYDROCHLORIDE 5 MG/1
5 TABLET ORAL EVERY 6 HOURS PRN
Qty: 28 TABLET | Refills: 0 | Status: SHIPPED | OUTPATIENT
Start: 2024-12-11 | End: 2024-12-18

## 2024-12-11 RX ORDER — ASPIRIN 81 MG/1
81 TABLET ORAL DAILY
Start: 2024-12-11

## 2024-12-11 ASSESSMENT — COGNITIVE AND FUNCTIONAL STATUS - GENERAL
DRESSING REGULAR LOWER BODY CLOTHING: A LITTLE
CLIMB 3 TO 5 STEPS WITH RAILING: A LITTLE
MOVING FROM LYING ON BACK TO SITTING ON SIDE OF FLAT BED WITH BEDRAILS: A LITTLE
DAILY ACTIVITIY SCORE: 20
TURNING FROM BACK TO SIDE WHILE IN FLAT BAD: A LITTLE
MOVING TO AND FROM BED TO CHAIR: A LITTLE
TOILETING: A LITTLE
DAILY ACTIVITIY SCORE: 19
TURNING FROM BACK TO SIDE WHILE IN FLAT BAD: A LITTLE
MOBILITY SCORE: 18
STANDING UP FROM CHAIR USING ARMS: A LITTLE
HELP NEEDED FOR BATHING: A LITTLE
DRESSING REGULAR UPPER BODY CLOTHING: A LITTLE
MOVING TO AND FROM BED TO CHAIR: A LITTLE
HELP NEEDED FOR BATHING: A LITTLE
MOVING FROM LYING ON BACK TO SITTING ON SIDE OF FLAT BED WITH BEDRAILS: A LITTLE
CLIMB 3 TO 5 STEPS WITH RAILING: A LITTLE
DRESSING REGULAR LOWER BODY CLOTHING: A LITTLE
MOBILITY SCORE: 18
PERSONAL GROOMING: A LITTLE
WALKING IN HOSPITAL ROOM: A LITTLE
STANDING UP FROM CHAIR USING ARMS: A LITTLE
WALKING IN HOSPITAL ROOM: A LITTLE
PERSONAL GROOMING: A LITTLE
TOILETING: A LITTLE

## 2024-12-11 ASSESSMENT — PAIN SCALES - GENERAL
PAINLEVEL_OUTOF10: 3
PAINLEVEL_OUTOF10: 4
PAINLEVEL_OUTOF10: 2
PAINLEVEL_OUTOF10: 0 - NO PAIN
PAINLEVEL_OUTOF10: 4
PAINLEVEL_OUTOF10: 4
PAINLEVEL_OUTOF10: 3
PAINLEVEL_OUTOF10: 7

## 2024-12-11 ASSESSMENT — PAIN DESCRIPTION - ORIENTATION
ORIENTATION: LOWER
ORIENTATION: LOWER

## 2024-12-11 ASSESSMENT — PAIN DESCRIPTION - LOCATION
LOCATION: BACK

## 2024-12-11 ASSESSMENT — ACTIVITIES OF DAILY LIVING (ADL)
EFFECT OF PAIN ON DAILY ACTIVITIES: .
HOME_MANAGEMENT_TIME_ENTRY: 30

## 2024-12-11 ASSESSMENT — PAIN SCALES - PAIN ASSESSMENT IN ADVANCED DEMENTIA (PAINAD): TOTALSCORE: MEDICATION (SEE MAR)

## 2024-12-11 ASSESSMENT — PAIN - FUNCTIONAL ASSESSMENT
PAIN_FUNCTIONAL_ASSESSMENT: 0-10

## 2024-12-11 NOTE — PROGRESS NOTES
Occupational Therapy    OT Treatment    Patient Name: Damián Gutiérrez  MRN: [id number]  Today's Date: 12/11/2024  Time Calculation  Start Time: 1209  Stop Time: 1239  Time Calculation (min): 30 min       4110/4110-A    Assessment:  End of Session Communication: Bedside nurse  End of Session Patient Position: Up in chair, Alarm on       Plan:  OT Frequency: Daily  OT Discharge Recommendations: Low intensity level of continued care (shower chair, reacher, sock aide, LH shoe horn)     Subjective      12/11/24 1209   OT Last Visit   OT Received On 12/11/24   General   Reason for Referral recent spine surgery   Referred By Philip Mclaughlin MD   Past Medical History Relevant to Rehab Admitted 12/9/2024 after having the following completed by Dr Goodwin: L2-3 L3-4 LAMINECTOMY, L3-4 L4-5 INSTRUMNENATION, L3-4 POSTERIOR INTERBODY FUSION, L2-3 L4-5 POSTERIOR LATERAL FUSION, REMOVAL HARDWARE L4-5  PMH: HTN, HLD, DM II, spinal stenosis   Prior to Session Communication Bedside nurse   Patient Position Received Up in chair;Alarm on   General Comment Pt agreeable to tx and cleared for participation.   Precautions   Medical Precautions Fall precautions   Post-Surgical Precautions Spinal precautions   Precautions Comment back brace when out of bed   Pain Assessment   Pain Assessment 0-10   0-10 (Numeric) Pain Score 2   Pain Type Surgical pain   Pain Location Back   Pain Orientation Lower   Pain Interventions Distraction;Rest   Cognition   Overall Cognitive Status WFL   Grooming   Grooming Level of Assistance Contact guard   Grooming Where Assessed Standing sinkside   Grooming Comments Pt completed light grooming tasks with cues for safety in stance with follow through; educated in comp techniques to maintain precautions with good follow through.   UE Dressing   UE Dressing Level of Assistance Distant supervision   UE Dressing Where Assessed Recliner   UE Dressing Comments Pt doffed/donned back brace with S, good follow through  of precautions.   LE Dressing   LE Dressing Yes   LE Dressing Adaptive Equipment Reacher;Sock aide   Pants Level of Assistance Contact guard   Sock Level of Assistance Close supervision   LE Dressing Where Assessed Recliner   LE Dressing Comments Pt demo'd use of AE to don pants, doff/don clean socks while seated, good use of AE, safety and adhering to precautions.   Functional Mobility   Functional Mobility Performed Pt ambulated within room at fww level with CGA, to and from sink.   Transfer 1   Technique 1 Sit to stand;Stand to sit   Transfer Device 1 Gait belt;Walker   Transfer Level of Assistance 1 Close supervision   Trials/Comments 1 STS from recliner to fww level several trials with SBA overall, demo's fair safety throughout   Tub Transfers   Tub Transfers Comments Educated in benefits of shower chair, however pt pleasantly declines and reports will not utilize. Pt reports plan to sponge bathe initially, educated in benefits of seated sponge bathing and use of LH sponge with understanding.   Inpatient Plan   OT Frequency Daily   OT Discharge Recommendations Low intensity level of continued care  (shower chair, reacher, sock aide, LH shoe horn)     Outcome Measures:[facility] Daily Activity  Putting on and taking off regular lower body clothing: A little  Bathing (including washing, rinsing, drying): A little  Putting on and taking off regular upper body clothing: None  Toileting, which includes using toilet, bedpan or urinal: A little  Taking care of personal grooming such as brushing teeth: A little  Eating Meals: None  Daily Activity - Total Score: 20  Education Documentation  No documentation found.  Education Comments  No comments found.            Goals:  Encounter Problems       Encounter Problems (Active)       Dressings Lower Extremities       STG - Patient will complete lower body dressing independently with AE and comp strategies  (Progressing)       Start:  12/10/24    Expected End:  12/24/24                Functional Balance       STG-Patient will be independent with assistive device dynamic stand task >5 minutes for ADL completion   (Progressing)       Start:  12/10/24    Expected End:  12/24/24               Functional Mobility       STG-Patient will be independent with assistive device functional mobility tasks   (Progressing)       Start:  12/10/24    Expected End:  12/24/24               OT Transfers       STG - Patient will perform car transfers independently demonstrating good safety  (Progressing)       Start:  12/10/24    Expected End:  12/24/24            STG-Patient will be independent with functional transfers demonstrating good safety   (Progressing)       Start:  12/10/24    Expected End:  12/24/24               Safety       STG-Patient will independently verbalize spine precautions with all functional tasks   (Progressing)       Start:  12/10/24    Expected End:  12/24/24

## 2024-12-11 NOTE — DISCHARGE SUMMARY
Discharge summary    Discharge Diagnosis  Back pain of lumbar region with sciatica      This patient Damián Gutiérrez was admitted to the hospital on 12/9/2024  after undergoing Procedure(s) (LRB):  L2-3 L3-4 LAMINECTOMY, L3-4 L4-5 INSTRUMNENATION, L3-4 POSTERIOR INTERBODY FUSION, L2-3 L4-5 POSTERIOR LATERAL FUSION, REMOVAL HARDWARE L4-5, CELL SAVER-notified (N/A) without complications.    During the postoperative period,while in hospital, patient was medically managed by the hospitalist. Please see medial notes and H&P for patients additional diagnoses.  Ortho agrees with all medical diagnoses and treatments while patient in hospital.  No significant or unexpected findings or abnormal ortho imaging were noted during the hospital stay    Hospital course      Patient tolerated surgical procedure well and there was no complications. Patient progressed adequately through their recovery during hospital stay including PT and rehabilitation.    Patient was then D/C on  to home  in stable condition.  Patient was instructed on the use of pain medications, the signs and symptoms of infection, and was given our number to call should they have any questions or concerns following discharge.    Based on my clinical judgment, the patient was provided with a 7-day prescription for opioid medication at 30 MED, indicated for treatment of acute pain in the setting of recent L2-3 and L3-4 laminectomy decompressing the L2, 3, and 4 vertebral segments. L3-4 posterior lumbar interbody fusion. Instrumentation L3-4. Use of interbody cage at L3-4. Posterior lateral fusion L2-3 and L4-5. Exploration of spinal fusion L4-5. Thermal ablation of the medial nerve branch supplying the facets bilaterally at L2-3, and L3-4. . OARRS report was run and has demonstrated an appropriate time course.  The patient has been provided with counseling pertaining to safe use of opioid medication.    Pertinent Physical Exam At Time of Discharge  Alert,  oriented x 3, cooperative with examination.     Examination of the back reveals lumbar  dressing that is intact without drainage.  No bonifacio-incisional ecchymosis.  EHL, plantarflexion, dorsiflexion are 4+/5.   Sensory intact light touch in the deep/superficial/common peroneal, saphenous, tibial, sural nerve distributions.  DP and popliteal pulses are 2+ with capillary refill less than 2 seconds.  Negative Homans' sign.      Home Medications     Medication List      START taking these medications     acetaminophen 325 mg tablet; Commonly known as: Tylenol; Take 2 tablets   (650 mg) by mouth every 6 hours.   cyclobenzaprine 10 mg tablet; Commonly known as: Flexeril; Take 1 tablet   (10 mg) by mouth 3 times a day as needed for muscle spasms for up to 5   days.   oxyCODONE 5 mg immediate release tablet; Commonly known as: Roxicodone;   Take 1 tablet (5 mg) by mouth every 6 hours if needed for moderate pain (4   - 6) or severe pain (7 - 10) for up to 7 days.     CHANGE how you take these medications     aspirin 81 mg EC tablet; Take 1 tablet (81 mg) by mouth once daily. Do   not restart this medication  until 12/16/2024; What changed: additional   instructions     CONTINUE taking these medications     amLODIPine 5 mg tablet; Commonly known as: Norvasc; Take 1 tablet (5 mg)   by mouth 2 times a day.   esomeprazole 20 mg DR capsule; Commonly known as: NexIUM   ezetimibe 10 mg tablet; Commonly known as: Zetia; Take 1 tablet (10 mg)   by mouth once daily.   lisinopril 10 mg tablet; TAKE 1 TABLET BY MOUTH DAILY FOR BLOOD PRESSURE   metoprolol succinate XL 25 mg 24 hr tablet; Commonly known as:   Toprol-XL; Take 1 tablet (25 mg) by mouth once daily. Do not crush or   chew.     STOP taking these medications     nitroglycerin 0.4 mg SL tablet; Commonly known as: Nitrostat           Patient may ambulate with use of walker for assistance   Surgical dressing to be removed on 12/13 and leave incision open to air  Frequent ambulation  for DVT prophylaxis  Follow up with surgeon in 2 weeks    Radiology images FL fluoro images no charge    Result Date: 12/9/2024  These images are not reportable by radiology and will not be interpreted by  Radiologists.    ECG 12 Lead    Result Date: 11/26/2024  Sinus bradycardia Incomplete right bundle branch block Borderline ECG When compared with ECG of 23-FEB-2024 08:11, No significant change was found Confirmed by Nii Canada ([id number]) on 11/26/2024 4:26:24 PM       Outpatient Follow-Up  Future Appointments   Date Time Provider Department Center   12/20/2024  2:15 PM Philip Mclaughlin MD [id number] [place]   3/10/2025 11:30 AM Leland Arauz DO [id number] [place]             Jolie Painter PA-C

## 2024-12-11 NOTE — DISCHARGE INSTRUCTIONS
No heavy lifting or straining until patient is seen in the office  Ok to remove dressing in 48 hours and get wound wet in th shower. Do no scrub. Cover wound with dry dressing after shower. No baths, hot tubs, or pools.   Encourage ambulation at least 3 times per day.   No formal physical therapy until ordered by Dr. Mclaughlin  Follow up in the office in two weeks. Appointment made prior to surgery  You must be accompanied by a responsible adult upon discharge and for 24 hours after surgery. Do no drive a motor vehicle, operate machinery, power tools or appliances, drink alcoholic beverages, or make critical decisions for 24 hours and while on narcotic pain medication.  Be aware of dizziness, which may cause a fall. Change positions slowly.   You may resume regular diet, but do so slowly.   Use your pain medication as prescribed by your physician/nurse practitioner/physician assistant. If possible, take your medication with food, and drink plenty of fluids.   Contact surgeon if you have questions, temperature of 101 degree or greater, increased bleeding, swelling, or pain, drainage from the incision or increased redness around the incision   Call [phone] with any questions or concerns

## 2024-12-11 NOTE — CARE PLAN
The patient's goals for the shift include      The clinical goals for the shift include pain control & to monitor pt's drain output    Pt discharge complete. Pt voiced understanding. Iv removed. Meds to beds delivered.     Awaiting wife to come to head home

## 2024-12-11 NOTE — PROGRESS NOTES
"Damián Gutiérrez is a 54 y.o. male on day 2 of admission presenting with Back pain of lumbar region with sciatica.    Subjective   Mr. Gutiérrez is more comfortable today, was able to sleep last night. Last evening, he was able to ambulate In the halls. His legs feel stronger today, still notes some right thigh discomfort/numbness.        Objective     Physical Exam  Vitals reviewed.   HENT:      Head: Normocephalic.      Mouth/Throat:      Mouth: Mucous membranes are moist.      Pharynx: Oropharynx is clear.   Eyes:      Extraocular Movements: Extraocular movements intact.   Cardiovascular:      Rate and Rhythm: Normal rate.   Pulmonary:      Effort: Pulmonary effort is normal.   Abdominal:      Palpations: Abdomen is soft.   Musculoskeletal:      Comments: Lumbar dressing is dry and intact. Drain #1 with 5ml output overnight, Drain #2 with 20ml output overnight,  light touch sensation is intact, ankle dorsiflexion/plantar flexion is intact. DP 2+/2 palpable     Skin:     General: Skin is warm and dry.      Capillary Refill: Capillary refill takes less than 2 seconds.   Neurological:      General: No focal deficit present.      Mental Status: He is alert. Mental status is at baseline.   Psychiatric:         Mood and Affect: Mood normal.         Last Recorded Vitals  Blood pressure 118/59, pulse 70, temperature 36.6 °C (97.9 °F), temperature source Temporal, resp. rate 16, height 1.626 m (5' 4\"), weight 70.1 kg (154 lb 8 oz), SpO2 93%.  Intake/Output last 3 Shifts:  I/O last 3 completed shifts:  In: 3503.3 (50 mL/kg) [P.O.:2040; I.V.:963.3 (13.7 mL/kg); IV Piggyback:500]  Out: 5155 (73.6 mL/kg) [Urine:4875 (1.9 mL/kg/hr); Drains:280]  Weight: 70.1 kg     Relevant Results      Scheduled medications  acetaminophen, 650 mg, oral, q6h  amLODIPine, 5 mg, oral, BID  ezetimibe, 10 mg, oral, Daily  metoprolol succinate XL, 25 mg, oral, Daily  nicotine, 1 patch, transdermal, Daily  oxygen, , inhalation, Continuous - " 02/gases  pantoprazole, 40 mg, oral, Daily before breakfast  polyethylene glycol, 17 g, oral, Daily      Continuous medications       PRN medications  PRN medications: cyclobenzaprine, dextrose, dextrose, glucagon, glucagon, morphine, naloxone, ondansetron **OR** ondansetron, oxyCODONE, oxyCODONE, oxyCODONE  Results for orders placed or performed during the hospital encounter of 12/09/24 (from the past 24 hours)   CBC   Result Value Ref Range    WBC 10.4 4.4 - 11.3 x10*3/uL    nRBC 0.0 0.0 - 0.0 /100 WBCs    RBC 4.61 4.50 - 5.90 x10*6/uL    Hemoglobin 13.6 13.5 - 17.5 g/dL    Hematocrit 41.3 41.0 - 52.0 %    MCV 90 80 - 100 fL    MCH 29.5 26.0 - 34.0 pg    MCHC 32.9 32.0 - 36.0 g/dL    RDW 12.4 11.5 - 14.5 %    Platelets 157 150 - 450 x10*3/uL   Basic metabolic panel   Result Value Ref Range    Glucose 121 (H) 74 - 99 mg/dL    Sodium 136 136 - 145 mmol/L    Potassium 3.9 3.5 - 5.3 mmol/L    Chloride 100 98 - 107 mmol/L    Bicarbonate 27 21 - 32 mmol/L    Anion Gap 13 10 - 20 mmol/L    Urea Nitrogen 13 6 - 23 mg/dL    Creatinine 0.78 0.50 - 1.30 mg/dL    eGFR >90 >60 mL/min/1.73m*2    Calcium 8.8 8.6 - 10.3 mg/dL               FL fluoro images no charge    Result Date: 12/9/2024  These images are not reportable by radiology and will not be interpreted by  Radiologists.    ECG 12 Lead    Result Date: 11/26/2024  Sinus bradycardia Incomplete right bundle branch block Borderline ECG When compared with ECG of 23-FEB-2024 08:11, No significant change was found Confirmed by Nii Canada ([id number]) on 11/26/2024 4:26:24 PM                Assessment/Plan   Assessment & Plan  Back pain of lumbar region with sciatica    Spinal stenosis, lumbar region without neurogenic claudication    Arthrodesis status    POD # 2 s/p  L2-3 and L3-4 laminectomy decompressing the L2, 3, and 4 vertebral segments. L3-4 posterior lumbar interbody fusion. Instrumentation L3-4. Use of interbody cage at L3-4. Posterior lateral fusion L2-3 and L4-5.  Exploration of spinal fusion L4-5. Thermal ablation of the medial nerve branch supplying the facets bilaterally at L2-3, and L3-4.   PT/OT, WBAT B LE, wear lumbar brace for ambulation of distances greater than 10 feet  Will remove lumbar drains today and discontinue IV antibiotic  Multimodal pain regimen  Bowel regimen  Surgical dressing to be removed  12/12  Labwork reviewed  VTE prophylaxis: WILY stockings, SCDs, early ambulation  Disp: home   Follow up with Dr. Mclaughlin as directed         I spent 25 minutes in the professional and overall care of this patient.      Jolie Painter PA-C

## 2024-12-11 NOTE — NURSING NOTE
This pt remained stable throughout this shift. VSS. Pain managed. Antibiotics given. No needs at this time. Call light in reach.

## 2024-12-11 NOTE — CARE PLAN
The patient's goals for the shift include      The clinical goals for the shift include pt will demonstrate comfort aeb sleeping in intervals of 3 hours or greater by end of this shift    Over the shift, the patient did  make progress toward the following goals. Pt alert and oriented x 3 has been up in chair ambulated in hallway, up to restroom and demonstrating exercises pt has received scheduled tylenol  and prn oxy 5 and flexeril which was effective allowing him to sleep in long intervals however rating pain at 0300 a 7 stated having thigh pain, medicated with oxy 10 and reposition on side with pillow between legs and in back pt state improvement when reasseses able to sleep . Pt has able to use ncl and remained safe this shift

## 2024-12-11 NOTE — PROGRESS NOTES
Physical Therapy    Physical Therapy    Physical Therapy Treatment    Patient Name: Damián Gutiérrez  MRN: [id number]  Today's Date: 12/11/2024  Time Calculation  Start Time: 1320  Stop Time: 1335  Time Calculation (min): 15 min     4110/4110-A       12/11/24 1320   PT  Visit   PT Received On 12/11/24   Response to Previous Treatment Patient with no complaints from previous session.   General   Reason for Referral recent spine surgery   Referred By Philip Mclaughlin MD   Past Medical History Relevant to Rehab Admitted 12/9/2024 after having the following completed by Dr Goodwin: L2-3 L3-4 LAMINECTOMY, L3-4 L4-5 INSTRUMNENATION, L3-4 POSTERIOR INTERBODY FUSION, L2-3 L4-5 POSTERIOR LATERAL FUSION, REMOVAL HARDWARE L4-5  PMH: HTN, HLD, DM II, spinal stenosis   Prior to Session Communication Bedside nurse   Patient Position Received Up in chair;Alarm on   General Comment Patient agreeable to therapy and cleared for participation   Precautions   Medical Precautions Fall precautions   Post-Surgical Precautions Spinal precautions   Precautions Comment back brace when out of bed   Pain Assessment   Pain Assessment 0-10   0-10 (Numeric) Pain Score 4   Pain Type Surgical pain   Pain Location Back   Pain Orientation Lower   Effect of Pain on Daily Activities .   Pain Interventions Repositioned   Cognition   Overall Cognitive Status WFL   Ambulation/Gait Training   Ambulation/Gait Training Performed Yes   Ambulation/Gait Training 1   Surface 1 Level tile   Device 1 Rolling walker   Gait Support Devices Gait belt   Assistance 1 Contact guard;Close supervision   Quality of Gait 1 Decreased step length;Inconsistent stride length   Comments/Distance (ft) 1 250' slow reciprocal gait with good safety awareness   Transfers   Transfer Yes   Transfer 1   Transfer From 1 Sit to   Transfer to 1 Stand   Technique 1 Sit to stand;Stand to sit   Transfer Device 1 Walker   Transfer Level of Assistance 1 Close supervision   Trials/Comments 1  x4   Stairs   Stairs Yes   Stairs   Rails 1 Left   Device 1 Single point cane   Support Devices 1 Gait belt   Assistance 1 Close supervision   Comment/Number of Steps 1 x3   Activity Tolerance   Endurance Endurance does not limit participation in activity   PT Assessment   PT Assessment Results Decreased strength;Decreased endurance;Impaired balance;Decreased mobility   Rehab Prognosis Good   End of Session Communication Bedside nurse   Assessment Comment Pt is grossly steady with good safety awareness   End of Session Patient Position Up in chair;Alarm on     Outcome Measures:  [facility] Basic Mobility  Turning from your back to your side while in a flat bed without using bedrails: A little  Moving from lying on your back to sitting on the side of a flat bed without using bedrails: A little  Moving to and from bed to chair (including a wheelchair): A little  Standing up from a chair using your arms (e.g. wheelchair or bedside chair): A little  To walk in hospital room: A little  Climbing 3-5 steps with railing: A little  Basic Mobility - Total Score: 18                             EDUCATION:  Outpatient Education  Individual(s) Educated: Patient  Education Provided: Fall Risk  Education Documentation  Precautions, taught by Adina Aguirre PTA at 12/11/2024  2:41 PM.  Learner: Patient  Readiness: Acceptance  Method: Explanation  Response: Verbalizes Understanding, Demonstrated Understanding    Body Mechanics, taught by Adina Aguirre PTA at 12/11/2024  2:41 PM.  Learner: Patient  Readiness: Acceptance  Method: Explanation  Response: Verbalizes Understanding, Demonstrated Understanding    Mobility Training, taught by Adina Aguirre PTA at 12/11/2024  2:41 PM.  Learner: Patient  Readiness: Acceptance  Method: Explanation  Response: Verbalizes Understanding, Demonstrated Understanding    Education Comments  No comments found.        GOALS:  Encounter Problems       Encounter Problems (Active)       PT Problem       Pt will  perform bed mobility with mod I.  (Progressing)       Start:  12/10/24    Expected End:  12/24/24            Pt will complete sit <> stand and bed <> chair transfers with mod I.  (Progressing)       Start:  12/10/24    Expected End:  12/24/24            Pt will ambulate 300 feet mod I using FWW with no significant gait deviations.   (Progressing)       Start:  12/10/24    Expected End:  12/24/24            Pt will ascend/descend at least 2 stairs SBA in order to navigate home environment.   (Progressing)       Start:  12/10/24    Expected End:  12/24/24               Pain - Adult

## 2024-12-17 LAB
LABORATORY COMMENT REPORT: NORMAL
PATH REPORT.FINAL DX SPEC: NORMAL
PATH REPORT.GROSS SPEC: NORMAL
PATH REPORT.RELEVANT HX SPEC: NORMAL
PATH REPORT.TOTAL CANCER: NORMAL

## 2024-12-19 DIAGNOSIS — E11.9 TYPE 2 DIABETES MELLITUS WITHOUT COMPLICATION, WITHOUT LONG-TERM CURRENT USE OF INSULIN (MULTI): Primary | ICD-10-CM

## 2024-12-20 ENCOUNTER — OFFICE VISIT (OUTPATIENT)
Dept: ORTHOPEDIC SURGERY | Facility: CLINIC | Age: 54
End: 2024-12-20
Payer: MEDICARE

## 2024-12-20 ENCOUNTER — HOSPITAL ENCOUNTER (OUTPATIENT)
Dept: RADIOLOGY | Facility: CLINIC | Age: 54
Discharge: HOME | End: 2024-12-20
Payer: MEDICARE

## 2024-12-20 DIAGNOSIS — M54.16 LUMBAR RADICULOPATHY: Primary | ICD-10-CM

## 2024-12-20 DIAGNOSIS — M54.16 LUMBAR RADICULOPATHY: ICD-10-CM

## 2024-12-20 PROCEDURE — 99211 OFF/OP EST MAY X REQ PHY/QHP: CPT | Performed by: ORTHOPAEDIC SURGERY

## 2024-12-20 PROCEDURE — 72100 X-RAY EXAM L-S SPINE 2/3 VWS: CPT | Performed by: ORTHOPAEDIC SURGERY

## 2024-12-20 PROCEDURE — 72100 X-RAY EXAM L-S SPINE 2/3 VWS: CPT

## 2024-12-20 NOTE — PROGRESS NOTES
Damián Gutiérrez is a 54 y.o. male who presents for Post-op of the Lower Back (L2-3 L3-4 LAMINECTOMY, L3-4 L4-5 INSTRUMNENATION, L3-4 POSTERIOR INTERBODY FUSION, L2-3 L4-5 POSTERIOR LATERAL FUSION, REMOVAL HARDWARE L4-5 12/9/24/Xr today).    HPI:  54-year-old gentleman here for postop visit.  He is 2 weeks out from an L2-L4 laminectomy with L3-4 TLIF with exploration of previous interspinous process device at L4-5.  He denies any fever chills nausea vomiting night sweats.  He has no bowel or bladder complaints.    Physical exam:  Well-nourished, well kept.No lymphangitis or lymphadenopathy in the examined extremities.  Gait normal.  Patient can rise from a seated position patient can sit from a standing position, using a rollator walker to ambulate examination of the back shows tenderness in the paraspinous musculature.  There is mild decreased range of motion in all directions due to guarding/muscle spasms and pain at extremes.  There is good strength and no instability.  Examination of the lower extremities reveals no point tenderness, swelling, or deformity.  Range of motion of the hips, knees, and ankles are full without crepitance, instability, or exacerbation of pain.  Strength is 5/5 throughout.  No redness, abrasions, or lesions on extremities  Gross sensation intact in the extremities.  Affect normal.  Alert and oriented ×3.  Coordination normal.  Midline incision is well-healed.  Staples are in place.    Imaging studies:  AP lateral plain films of the lumbar spine were ordered and reviewed today.    Assessment:  64-year-old gentleman here for postop visit.  He is 2 weeks out from an L2-L4 laminectomy with L3-4 TLIF with exploration of previous interspinous process device at L4-5 he is doing well today at least 70 to 75% better.  The remaining concerns he has is mostly some pain and ache in his low back and a little bit in his right quadricep.  The bulk of his preoperative leg pain seems to be gone.  He  has been wearing his brace as instructed.  He has been following all postop restrictions.  We discussed restrictions and the brace again today.  Overall he is doing well and is happy with the outcome.    Plan:  We will get those staples out today.  He can engage in light activities up to the restrictions of the surgery.  We will see him back in 3 months for his regularly scheduled 3-month visit and we will get AP lateral x-rays of his lumbar spine at that time.    Dameon Woodson PA-C

## 2025-01-19 DIAGNOSIS — E11.59 HYPERTENSION ASSOCIATED WITH TYPE 2 DIABETES MELLITUS (MULTI): ICD-10-CM

## 2025-01-19 DIAGNOSIS — I15.2 HYPERTENSION ASSOCIATED WITH TYPE 2 DIABETES MELLITUS (MULTI): ICD-10-CM

## 2025-01-20 RX ORDER — AMLODIPINE BESYLATE 5 MG/1
5 TABLET ORAL 2 TIMES DAILY
Qty: 180 TABLET | Refills: 3 | Status: SHIPPED | OUTPATIENT
Start: 2025-01-20

## 2025-01-20 NOTE — TELEPHONE ENCOUNTER
Received request for prescription refills for patient.   Patient follows with Dr. Arauz    Request is for Amlodipine 5 mg  Is patient currently on medication yes    Last OV 03/4/2024  Next OV 03/10/2025    Pended for signing and sent to provider

## 2025-02-14 DIAGNOSIS — E11.59 HYPERTENSION ASSOCIATED WITH TYPE 2 DIABETES MELLITUS (MULTI): ICD-10-CM

## 2025-02-14 DIAGNOSIS — E11.69 HYPERLIPIDEMIA DUE TO TYPE 2 DIABETES MELLITUS (MULTI): ICD-10-CM

## 2025-02-14 DIAGNOSIS — E78.5 HYPERLIPIDEMIA DUE TO TYPE 2 DIABETES MELLITUS (MULTI): ICD-10-CM

## 2025-02-14 DIAGNOSIS — I15.2 HYPERTENSION ASSOCIATED WITH TYPE 2 DIABETES MELLITUS (MULTI): ICD-10-CM

## 2025-02-14 RX ORDER — EZETIMIBE 10 MG/1
10 TABLET ORAL DAILY
Qty: 90 TABLET | Refills: 3 | Status: SHIPPED | OUTPATIENT
Start: 2025-02-14 | End: 2026-02-14

## 2025-02-14 RX ORDER — METOPROLOL SUCCINATE 25 MG/1
25 TABLET, EXTENDED RELEASE ORAL DAILY
Qty: 90 TABLET | Refills: 3 | Status: SHIPPED | OUTPATIENT
Start: 2025-02-14 | End: 2026-02-14

## 2025-02-14 NOTE — TELEPHONE ENCOUNTER
Received request for prescription refills for patient.   Patient follows with Dr. Arauz    Request is for Toprol Xl and Zetia  Is patient currently on medication yes    Last OV 3/4/2024  Next OV 3/10/2025    Pended for signing and sent to provider

## 2025-03-03 LAB
ALBUMIN SERPL-MCNC: 4.6 G/DL (ref 3.6–5.1)
ALP SERPL-CCNC: 78 U/L (ref 35–144)
ALT SERPL-CCNC: 16 U/L (ref 9–46)
ANION GAP SERPL CALCULATED.4IONS-SCNC: 9 MMOL/L (CALC) (ref 7–17)
AST SERPL-CCNC: 13 U/L (ref 10–35)
BILIRUB SERPL-MCNC: 0.4 MG/DL (ref 0.2–1.2)
BUN SERPL-MCNC: 15 MG/DL (ref 7–25)
CALCIUM SERPL-MCNC: 9.8 MG/DL (ref 8.6–10.3)
CHLORIDE SERPL-SCNC: 101 MMOL/L (ref 98–110)
CHOLEST SERPL-MCNC: 276 MG/DL
CHOLEST/HDLC SERPL: 6 (CALC)
CO2 SERPL-SCNC: 29 MMOL/L (ref 20–32)
CREAT SERPL-MCNC: 0.84 MG/DL (ref 0.7–1.3)
EGFRCR SERPLBLD CKD-EPI 2021: 102 ML/MIN/1.73M2
GLUCOSE SERPL-MCNC: 159 MG/DL (ref 65–99)
HDLC SERPL-MCNC: 46 MG/DL
LDLC SERPL CALC-MCNC: 176 MG/DL (CALC)
NONHDLC SERPL-MCNC: 230 MG/DL (CALC)
POTASSIUM SERPL-SCNC: 4.6 MMOL/L (ref 3.5–5.3)
PROT SERPL-MCNC: 7.5 G/DL (ref 6.1–8.1)
SODIUM SERPL-SCNC: 139 MMOL/L (ref 135–146)
TRIGL SERPL-MCNC: 319 MG/DL

## 2025-03-10 ENCOUNTER — APPOINTMENT (OUTPATIENT)
Dept: CARDIOLOGY | Facility: CLINIC | Age: 55
End: 2025-03-10
Payer: MEDICARE

## 2025-03-10 ENCOUNTER — DOCUMENTATION (OUTPATIENT)
Dept: INFUSION THERAPY | Facility: CLINIC | Age: 55
End: 2025-03-10

## 2025-03-10 VITALS
WEIGHT: 164.4 LBS | DIASTOLIC BLOOD PRESSURE: 82 MMHG | SYSTOLIC BLOOD PRESSURE: 148 MMHG | HEIGHT: 64 IN | BODY MASS INDEX: 28.07 KG/M2 | HEART RATE: 92 BPM

## 2025-03-10 DIAGNOSIS — E78.2 MIXED HYPERLIPIDEMIA: Primary | ICD-10-CM

## 2025-03-10 DIAGNOSIS — E78.5 HYPERLIPIDEMIA DUE TO TYPE 2 DIABETES MELLITUS (MULTI): ICD-10-CM

## 2025-03-10 DIAGNOSIS — E11.59 HYPERTENSION ASSOCIATED WITH TYPE 2 DIABETES MELLITUS (MULTI): ICD-10-CM

## 2025-03-10 DIAGNOSIS — I25.10 CORONARY ARTERY DISEASE INVOLVING NATIVE HEART, UNSPECIFIED VESSEL OR LESION TYPE, UNSPECIFIED WHETHER ANGINA PRESENT: ICD-10-CM

## 2025-03-10 DIAGNOSIS — I15.2 HYPERTENSION ASSOCIATED WITH TYPE 2 DIABETES MELLITUS (MULTI): ICD-10-CM

## 2025-03-10 DIAGNOSIS — E78.2 MIXED HYPERLIPIDEMIA: ICD-10-CM

## 2025-03-10 DIAGNOSIS — E11.9 TYPE 2 DIABETES MELLITUS WITHOUT COMPLICATION, WITHOUT LONG-TERM CURRENT USE OF INSULIN (MULTI): ICD-10-CM

## 2025-03-10 DIAGNOSIS — E11.69 HYPERLIPIDEMIA DUE TO TYPE 2 DIABETES MELLITUS (MULTI): ICD-10-CM

## 2025-03-10 DIAGNOSIS — F17.200 CURRENT EVERY DAY SMOKER: ICD-10-CM

## 2025-03-10 PROCEDURE — 3079F DIAST BP 80-89 MM HG: CPT | Performed by: INTERNAL MEDICINE

## 2025-03-10 PROCEDURE — 4010F ACE/ARB THERAPY RXD/TAKEN: CPT | Performed by: INTERNAL MEDICINE

## 2025-03-10 PROCEDURE — 3077F SYST BP >= 140 MM HG: CPT | Performed by: INTERNAL MEDICINE

## 2025-03-10 PROCEDURE — 99214 OFFICE O/P EST MOD 30 MIN: CPT | Performed by: INTERNAL MEDICINE

## 2025-03-10 PROCEDURE — 3008F BODY MASS INDEX DOCD: CPT | Performed by: INTERNAL MEDICINE

## 2025-03-10 RX ORDER — EPINEPHRINE 0.3 MG/.3ML
0.3 INJECTION SUBCUTANEOUS EVERY 5 MIN PRN
OUTPATIENT
Start: 2025-03-10

## 2025-03-10 RX ORDER — EZETIMIBE 10 MG/1
10 TABLET ORAL DAILY
Qty: 90 TABLET | Refills: 3 | Status: SHIPPED | OUTPATIENT
Start: 2025-03-10 | End: 2026-03-10

## 2025-03-10 RX ORDER — ALBUTEROL SULFATE 0.83 MG/ML
3 SOLUTION RESPIRATORY (INHALATION) AS NEEDED
OUTPATIENT
Start: 2025-03-10

## 2025-03-10 RX ORDER — AMLODIPINE BESYLATE 10 MG/1
10 TABLET ORAL DAILY
Qty: 90 TABLET | Refills: 3 | Status: SHIPPED | OUTPATIENT
Start: 2025-03-10 | End: 2026-03-10

## 2025-03-10 RX ORDER — DIPHENHYDRAMINE HYDROCHLORIDE 50 MG/ML
50 INJECTION INTRAMUSCULAR; INTRAVENOUS AS NEEDED
OUTPATIENT
Start: 2025-03-10

## 2025-03-10 RX ORDER — LISINOPRIL 10 MG/1
10 TABLET ORAL DAILY
Qty: 90 TABLET | Refills: 3 | Status: SHIPPED | OUTPATIENT
Start: 2025-03-10

## 2025-03-10 RX ORDER — METOPROLOL SUCCINATE 25 MG/1
25 TABLET, EXTENDED RELEASE ORAL DAILY
Qty: 90 TABLET | Refills: 3 | Status: SHIPPED | OUTPATIENT
Start: 2025-03-10 | End: 2026-03-10

## 2025-03-10 RX ORDER — FAMOTIDINE 10 MG/ML
20 INJECTION, SOLUTION INTRAVENOUS ONCE AS NEEDED
OUTPATIENT
Start: 2025-03-10

## 2025-03-10 NOTE — PATIENT INSTRUCTIONS
Continue same medications and treatments.   Patient educated on proper medication use.   Patient educated on risk factor modification.   Please bring any lab results from other providers / physicians to your next appointment.     Please bring all medicines, vitamins, and herbal supplements with you when you come to the office.     Prescriptions will not be filled unless you are compliant with your follow up appointments or have a follow up appointment scheduled as per instruction of your physician. Refills should be requested at the time of your visit.    Check your blood pressure 3 times a week for 2 weeks prior to your next office visit. Bring your blood pressure readings and your blood pressure machine to your next office visit.     AVE ORDERED    CHANGE AMLODIPINE TO 10 MG DAILY    OBTAIN FASTING LAB WORK IN 1 YEAR PRIOR TO YOUR OFFICE VISIT    FOLLOW UP IN 1 YEAR    I, Lety Mcconnell LPN, am scribing for and in the presence of Dr. Leland Arauz, DO, [facility]

## 2025-03-10 NOTE — PROGRESS NOTES
CARDIOLOGY OFFICE VISIT      CHIEF COMPLAINT  No chief complaint on file.       HISTORY OF PRESENT ILLNESS  The patient is a 54-year-old  male with past medical history significant for coronary artery disease, coronary artery vasospasm, dyslipidemia, hypertension, diabetes, tobacco abuse, who presents for followup cardiovascular care.      Patient underwent back surgery in December without cardiac complication.  Denies chest pain, dyspnea, palpitations, nausea, vomiting, dizziness, near-syncope, heidi syncope, edema, bleeding.  He has been walking slowly on the treadmill 3 times a week for 20 minutes for physical therapy.  Continues to smoke 1 pack of cigarettes per day.  He ran out of his medications and did not call the office for refill.  Home blood pressure readings are mildly elevated.  Patient drinks 1 glass of water daily.          PAST SURGICAL HISTORY  L4-L5 back surgery x 2 .   Tonsils and adenoidectomy.   L2-3, L3-4 laminectomy with fusion 2024     SOCIAL HISTORY: The patient is an active smoker, one pack per day for 25  years. Drinks alcohol on the weekends.      FAMILY HISTORY: Brother and had myocardial infarction and angioplasty in his  40s. Mom is alive at age 76 without significant disease. Dad  at 67,  had coronary artery bypass graft at age 56 and had heart failure.      Review systems are negative, noncontributory, orders previously mentioned x12 systems.     ASSESSMENT:      Coronary artery disease-50% distal RCA stenosis on cath 2024 with coronary artery vasospasm  with chronic stable angina.  Active tobacco abuse.  Dyslipidemia.  Hypertension.  Diabetes mellitus.  Apnea - currently declines sleep study due to cost  History of medical noncompliance, taking medications.  History of elevated transaminase.  Family history of premature coronary artery disease.   Chronic back pain, status post L4-L5 back surgery.   Intolerance to Simvastatin 80  milligrams, Lipitor, rosuvastatin, rosuvastin, and Pravastatin due to myalgia.   Intolerance to C-Q10 due to myalgia  Intolerance to Imdur due to dizziness and headache.   Off fenofibrate due to cost        RECOMMENDATIONS:   Patient appears to be stable from a cardiac standpoint.  Resume metoprolol 25 mg daily, amlodipine 10 mg daily, Zetia 10 mg daily, lisinopril 10 mg daily.  No symptomatic arrhythmia.  No evidence of heart failure.  Reapply for Leqvio.  Again advised smoking cessation and explained adverse consequences on cardiac, pulmonary, vascular systems.  Increase water intake 64 ounces per day.  Maintain blood pressure diary prior to office visits.  Follow-up in 1 year.  Check CMP, lipid profile at that time.     Please excuse any errors in grammar or translation related to this dictation. Voice recognition software was utilized to prepare this document.     Recent Cardiovascular Testing:  The following results have been reviewed and updated.         Labs: 3/3/25  1., Trig 319, HDL 46,       CT Coronary 1/5/24  1.. Mid LAD noncalcified plaque with significant stensis  2.Sig.Stenosis  of proximal RCA with mixed plaque  3.Cor. [id number]     Holter: 2/6/08  1. Normal sinus rhythm.  2. PACs and PVCs.     GXT: 1/11/16  1. 1mm upsloping ST depression, inferolateral leads with maximal stress.  2. 11.4 mets     ECHO: 2/6/08  1. EF 55%.  2. Mild MR.     Cath: 2/23/2024  Distal RCA 50%.  2.   EF 55%.     CRD: 5/2/11  1. Mild carotid disease.          VITALS  Vitals:    03/10/25 1144   BP: 148/82   Pulse: 92     Wt Readings from Last 4 Encounters:   12/09/24 70.1 kg (154 lb 8 oz)   11/26/24 69.9 kg (154 lb)   11/19/24 64.9 kg (143 lb)   07/29/24 70.9 kg (156 lb 6.4 oz)       PHYSICAL EXAM:  GENERAL:  Well developed, well nourished, in no acute distress.  CHEST:  Symmetric and nontender.  NEURO/PSYCH:  Alert and oriented times three with approppriate behavior and responses.  NECK:  Supple, no JVD, no  bruit.  LUNGS:  Clear to auscultation bilaterally, normal respiratory effort.  HEART:  Rate and rhythm regular with no evident murmur, no gallop appreciated.    There are no rubs, clicks or heaves.  EXTREMITIES:  Warm with good color, no clubbing or cyanosis.  There is no edema noted.  PERIPHERAL VASCULAR:  Pulses present and equally palpable; 2+ throughout.    ASSESSMENT AND PLAN  Diagnoses and all orders for this visit:  Mixed hyperlipidemia  Coronary artery disease involving native heart, unspecified vessel or lesion type, unspecified whether angina present  Hypertension associated with type 2 diabetes mellitus (Multi)  Type 2 diabetes mellitus without complication, without long-term current use of insulin (Multi)  Current every day smoker  BMI 28.0-28.9,adult  Hyperlipidemia due to type 2 diabetes mellitus (Multi)      Past Medical History  Past Medical History:   Diagnosis Date    Coronary artery disease     Diabetes mellitus (Multi)     GERD (gastroesophageal reflux disease)     Hyperlipidemia     Hypertension     Personal history of other diseases of the circulatory system     History of cardiac disorder    Personal history of other endocrine, nutritional and metabolic disease     History of high cholesterol    Personal history of other endocrine, nutritional and metabolic disease     History of diabetes mellitus    Personal history of other endocrine, nutritional and metabolic disease     History of hyperglycemia       Social History  Social History     Tobacco Use    Smoking status: Every Day     Current packs/day: 1.00     Average packs/day: 1 pack/day for 72.2 years (72.2 ttl pk-yrs)     Types: Cigarettes     Start date: 1983    Smokeless tobacco: Never   Vaping Use    Vaping status: Never Used   Substance Use Topics    Alcohol use: Yes     Comment: weekly    Drug use: Yes     Types: Marijuana     Comment: last use 2 weeks ago       Family History     Family History   Problem Relation Name Age of Onset     "Other (brain hemorrhage) Mother      Heart failure Father      Heart attack Brother          Allergies:  Allergies   Allergen Reactions    Shellfish Derived Unknown        Outpatient Medications:  Current Outpatient Medications   Medication Instructions    acetaminophen (TYLENOL) 650 mg, oral, Every 6 hours    amLODIPine (NORVASC) 5 mg, oral, 2 times daily    aspirin 81 mg, oral, Daily, Do not restart this medication  until 12/16/2024    cyclobenzaprine (FLEXERIL) 10 mg, oral, 3 times daily PRN    esomeprazole (NEXIUM) 40 mg, oral, Daily before breakfast, Do not open capsule.    ezetimibe (ZETIA) 10 mg, oral, Daily    lisinopril 10 mg, oral, Daily, for blood pressure    metoprolol succinate XL (TOPROL-XL) 25 mg, oral, Daily        Recent Lab Results:    CMP:    Lab Results   Component Value Date     03/03/2025    K 4.6 03/03/2025     03/03/2025    CO2 29 03/03/2025    BUN 15 03/03/2025    CREATININE 0.84 03/03/2025    GLUCOSE 159 (H) 03/03/2025    CALCIUM 9.8 03/03/2025       Magnesium:    Lab Results   Component Value Date    MG 2.17 01/04/2018       Lipid Profile:    Lab Results   Component Value Date    TRIG 319 (H) 03/03/2025    HDL 46 03/03/2025    LDLCALC 176 (H) 03/03/2025    LDLDIRECT 153 (H) 01/04/2018       TSH:    Lab Results   Component Value Date    TSH 0.96 01/04/2018       BNP:   No results found for: \"BNP\"     PT/INR:    Lab Results   Component Value Date    PROTIME 12.1 11/21/2024    INR 1.1 11/21/2024       HgBA1c:    Lab Results   Component Value Date    HGBA1C 6.8 (H) 11/21/2024       BMP:  Lab Results   Component Value Date     03/03/2025     12/11/2024     12/10/2024     11/21/2024    K 4.6 03/03/2025    K 3.9 12/11/2024    K 4.2 12/10/2024    K 4.1 11/21/2024     03/03/2025     12/11/2024     12/10/2024     11/21/2024    CO2 29 03/03/2025    CO2 27 12/11/2024    CO2 26 12/10/2024    CO2 28 11/21/2024    BUN 15 03/03/2025    BUN 13 " "12/11/2024    BUN 12 12/10/2024    BUN 15 11/21/2024    CREATININE 0.84 03/03/2025    CREATININE 0.78 12/11/2024    CREATININE 0.73 12/10/2024    CREATININE 0.73 11/21/2024       CBC:  Lab Results   Component Value Date    WBC 10.4 12/11/2024    WBC 14.2 (H) 12/10/2024    WBC 8.4 11/21/2024    RBC 4.61 12/11/2024    RBC 4.54 12/10/2024    RBC 5.09 11/21/2024    HGB 13.6 12/11/2024    HGB 13.3 (L) 12/10/2024    HGB 15.5 11/21/2024    HCT 41.3 12/11/2024    HCT 40.4 (L) 12/10/2024    HCT 45.1 11/21/2024    MCV 90 12/11/2024    MCV 89 12/10/2024    MCV 89 11/21/2024    MCH 29.5 12/11/2024    MCH 29.3 12/10/2024    MCH 30.5 11/21/2024    MCHC 32.9 12/11/2024    MCHC 32.9 12/10/2024    MCHC 34.4 11/21/2024    RDW 12.4 12/11/2024    RDW 12.3 12/10/2024    RDW 12.4 11/21/2024     12/11/2024     12/10/2024     11/21/2024       Cardiac Enzymes:    No results found for: \"TROPHS\"    Hepatic Function Panel:    Lab Results   Component Value Date    ALKPHOS 78 03/03/2025    ALT 16 03/03/2025    AST 13 03/03/2025    PROT 7.5 03/03/2025    BILITOT 0.4 03/03/2025             ILety LPN, personally performed the services described in the documentation as scribed by Dr. Leland Arauz DO, [facility] in my presence, and confirm it is both accurate and complete.    Dr. Leland Arauz DO  Thank you for allowing me to participate in the care of this patient. Please do not hesitate to contact me with any further questions or concerns.          "

## 2025-03-10 NOTE — PROGRESS NOTES
"CLINICAL CLEARANCE FOR OUTPATIENT INJECTION      Patient to be scheduled for New Start of Leqvio injections.     For Diagnosis: Hypercholesteremia     Baseline labs for review:  Lipid profile drawn:   Lab Results   Component Value Date    CHOL 276 (H) 03/03/2025    CHOL 264 (H) 12/04/2023    CHOL 218 (H) 12/07/2022     Lab Results   Component Value Date    HDL 46 03/03/2025    HDL 47.0 12/04/2023    HDL 41.2 12/07/2022     Lab Results   Component Value Date    LDLCALC 176 (H) 03/03/2025    LDLCALC 191 (H) 12/04/2023     Lab Results   Component Value Date    TRIG 319 (H) 03/03/2025    TRIG 129 12/04/2023    TRIG 111 12/07/2022     No components found for: \"CHOLHDL\"     Last injection received: N/A (if continuation)  Due: PAR to scheduled patient for New Start Leqvio Injection    Okay to schedule for treatment as ordered by prescribing provider.       "

## 2025-03-21 ENCOUNTER — OFFICE VISIT (OUTPATIENT)
Dept: ORTHOPEDIC SURGERY | Facility: CLINIC | Age: 55
End: 2025-03-21
Payer: MEDICARE

## 2025-03-21 ENCOUNTER — HOSPITAL ENCOUNTER (OUTPATIENT)
Dept: RADIOLOGY | Facility: CLINIC | Age: 55
Discharge: HOME | End: 2025-03-21
Payer: MEDICARE

## 2025-03-21 DIAGNOSIS — M54.16 LUMBAR RADICULOPATHY: Primary | ICD-10-CM

## 2025-03-21 DIAGNOSIS — M54.16 LUMBAR RADICULOPATHY: ICD-10-CM

## 2025-03-21 PROCEDURE — 99214 OFFICE O/P EST MOD 30 MIN: CPT | Performed by: ORTHOPAEDIC SURGERY

## 2025-03-21 PROCEDURE — 72100 X-RAY EXAM L-S SPINE 2/3 VWS: CPT

## 2025-03-21 NOTE — LETTER
March 21, 2025     Patient: Damián Gutiérrez   YOB: 1970   Date of Visit: 3/21/2025       To Whom It May Concern:    It is my medical opinion that Damián Gutiérrez may return to work on 4/21/2025 with no restrictions .    If you have any questions or concerns, please don't hesitate to call.         Sincerely,        Philip Mclaughlin MD

## 2025-03-21 NOTE — PROGRESS NOTES
Chief complaint: 3 months out  L2-3 L3-4 laminectomy with an L3-4 TLIF    HPI: Patient says he is 99% better with regards to his legs.  He is little numbness in the inside of his right foot as well as some numbness in his thighs bilaterally.  Otherwise he is doing quite well with regard to that.  He says his back pain is at least 80% better but there is still little tightness.  He wants to get back to work but he is hoping to get a little bit more back pain relief before he does that.  There is no fevers chills nausea vomiting.  No bowel or bladder changes.      Physical exam shows a well-healed incision.  He has no swelling warmth puffiness or signs of infection.  He can almost bend over and touch his toes.  He has slight decreased back range of motion with some pain at extremes.  He walks normally seems to have pretty good strength in his legs except a little weakness with dorsiflexion on the right.    AP lateral x-rays were ordered and reviewed today and it shows good positioning of the cage and screws for a TLIF.  Posterolateral fusion masses present as well.    Assessment/plan: Patient doing well 3 months out L2-3 L3-4 laminectomy above a prior L4-5 interspinous spacer.  All in all he is doing quite well with regard to that.  We will give him a note for work where he can go back to work full duties in a month.  He is still smoking.  I do long discussion with him about the potential ramifications of that and how that could create a nonunion and cause a lot of postoperative issues.  He understands that.  We will let him slowly wean himself back into normal activities.  And we will see him back in 3 months for AP lateral x-rays of the lumbar spine.  A glucose from 3/3/2025 was reviewed and is 159 so the patient also understands that better glucose control will help with healing.  He has 2 chronic stable problems of back achiness and soreness and numbness in his thighs.

## 2025-04-14 ENCOUNTER — APPOINTMENT (OUTPATIENT)
Dept: INFUSION THERAPY | Facility: CLINIC | Age: 55
End: 2025-04-14
Payer: MEDICARE

## 2025-04-29 ENCOUNTER — APPOINTMENT (OUTPATIENT)
Dept: INFUSION THERAPY | Facility: CLINIC | Age: 55
End: 2025-04-29
Payer: MEDICARE

## 2025-04-29 VITALS
SYSTOLIC BLOOD PRESSURE: 138 MMHG | HEART RATE: 72 BPM | OXYGEN SATURATION: 98 % | DIASTOLIC BLOOD PRESSURE: 78 MMHG | TEMPERATURE: 97 F | RESPIRATION RATE: 18 BRPM

## 2025-04-29 DIAGNOSIS — E78.2 MIXED HYPERLIPIDEMIA: ICD-10-CM

## 2025-04-29 PROCEDURE — 96372 THER/PROPH/DIAG INJ SC/IM: CPT | Performed by: NURSE PRACTITIONER

## 2025-04-29 RX ORDER — EPINEPHRINE 0.3 MG/.3ML
0.3 INJECTION SUBCUTANEOUS EVERY 5 MIN PRN
OUTPATIENT
Start: 2025-06-09

## 2025-04-29 RX ORDER — DIPHENHYDRAMINE HYDROCHLORIDE 50 MG/ML
50 INJECTION, SOLUTION INTRAMUSCULAR; INTRAVENOUS AS NEEDED
OUTPATIENT
Start: 2025-06-09

## 2025-04-29 RX ORDER — FAMOTIDINE 10 MG/ML
20 INJECTION, SOLUTION INTRAVENOUS ONCE AS NEEDED
OUTPATIENT
Start: 2025-06-09

## 2025-04-29 RX ORDER — ALBUTEROL SULFATE 0.83 MG/ML
3 SOLUTION RESPIRATORY (INHALATION) AS NEEDED
OUTPATIENT
Start: 2025-06-09

## 2025-04-29 ASSESSMENT — ENCOUNTER SYMPTOMS
VOMITING: 0
FATIGUE: 0
CONSTIPATION: 0
CHILLS: 0
SHORTNESS OF BREATH: 0
MYALGIAS: 1
HEADACHES: 0
ABDOMINAL PAIN: 0
ABDOMINAL DISTENTION: 0
WOUND: 0
PALPITATIONS: 0
WHEEZING: 0
FEVER: 0
APPETITE CHANGE: 0
LEG SWELLING: 0
ARTHRALGIAS: 1
NAUSEA: 0
COUGH: 1
NUMBNESS: 0
DIZZINESS: 0
DIARRHEA: 0
EXTREMITY WEAKNESS: 0

## 2025-04-29 NOTE — PATIENT INSTRUCTIONS
Today :We administered inclisiran.     For:   1. Mixed hyperlipidemia         Your next appointment is due in:  3 MONTHS        Please read the  Medication Guide that was given to you and reviewed during todays visit.     (Tell all doctors including dentists that you are taking this medication)     Go to the emergency room or call 911 if:  -You have signs of allergic reaction:   -Rash, hives, itching.   -Swollen, blistered, peeling skin.   -Swelling of face, lips, mouth, tongue or throat.   -Tightness of chest, trouble breathing, swallowing or talking     Call your doctor:  - If IV / injection site gets red, warm, swollen, itchy or leaks fluid or pus.     (Leave dressing on your IV site for at least 2 hours and keep area clean and dry  - If you get sick or have symptoms of infection or are not feeling well for any reason.    (Wash your hands often, stay away from people who are sick)  - If you have side effects from your medication that do not go away or are bothersome.     (Refer to the teaching your nurse gave you for side effects to call your doctor about)    - Common side effects may include:  stuffy nose, headache, feeling tired, muscle aches, upset stomach  - Before receiving any vaccines     - Call the [facility] at   If:  - You get sick, are on antibiotics, have had a recent vaccine, have surgery or dental work and your doctor wants your visit rescheduled.  - You need to cancel and reschedule your visit for any reason. Call at least 2 days before your visit if you need to cancel.   - Your insurance changes before your next visit.    (We will need to get approval from your new insurance. This can take up to two weeks.)     The [facility] is opened Monday thru Friday. We are closed on weekends and holidays.   Voice mail will take your call if the center is closed. If you leave a message please allow 24 hours for a call back during weekdays. If you leave a message on a  weekend/holiday, we will call you back the next business day.    A pharmacist is available Monday - Friday from 8:30AM to 3:30PM to help answer any questions you may have about your prescriptions(s). Please call pharmacy at:    [facility]: [phone]  [facility]: [phone]  [facility]: [phone]

## 2025-04-29 NOTE — PROGRESS NOTES
[facility]   Infusion Clinic Note   Date: 2025   Name: Damián Gutiérrez  : 1970   MRN: [id number]         Reason for Visit: OP Infusion (PATIENT HERE FOR LEQVIO 284 MG INJECTION)         Today: We administered inclisiran.       Ordered By: Leland Arauz DO       For a Diagnosis of: Mixed hyperlipidemia       At today's visit patient accompanied by: Wife      Today's Vitals:   Vitals:    25 1659   BP: 138/78   Pulse: 72   Resp: 18   Temp: 36.1 °C (97 °F)   SpO2: 98%             Pre - Treatment Checklist:      - Previous reaction to current treatment: n/a      (Assess patient for the concerns below. Document provider notification as appropriate).  - Active or recent infection with/without current antibiotic use: no  - Recent or planned invasive dental work: no  - Recent or planned surgeries: no  - Recently received or plans to receive vaccinations: no  - Has treatment related toxicities: no  - Any chance may be pregnant:  n/a      Pain: 7SHOULDER 3 BACK LEFT 1 HAND    - Is the pain different from normal: no   - Is prescribing Doctor aware:  n/a      Labs: Reviewed       Fall Risk Screening: Rizzo Fall Risk  History of Falling, Immediate or Within 3 Months: No  Secondary Diagnosis: No  Ambulatory Aid: Walks without aid/bedrest/nurse assist  Intravenous Therapy/Heparin Lock: No  Gait/Transferring: Normal/bedrest/immobile  Mental Status: Oriented to own ability  Rizzo Fall Risk Score: 0       Review Of Systems:  Review of Systems   Constitutional:  Negative for appetite change, chills, fatigue and fever.   Respiratory:  Positive for cough (SMOKERS COUGH). Negative for shortness of breath and wheezing.    Cardiovascular:  Negative for chest pain, leg swelling and palpitations.   Gastrointestinal:  Negative for abdominal distention, abdominal pain, constipation, diarrhea, nausea and vomiting.   Musculoskeletal:  Positive for arthralgias and myalgias. Negative for gait  "problem.   Skin:  Negative for itching, rash and wound.   Neurological:  Negative for dizziness, extremity weakness, gait problem, headaches and numbness.         Infusion Readiness:  - Assessment Concerns Related to Infusion: No  - Provider notified: n/a      New Patient Education:    NEW PATIENT MEDICATION EDUCATION PT PROVIDED WITH WRITTEN ([employer] PT EDUCATION SHEET) AND VERBAL EDUCATION REGARDING MEDICATION GIVEN. VERIFIED MEDICATION NAME WITH PATIENT AND DISCUSSED REASON FOR USE. BRIEFLY DISCUSSED HOW MEDICATION WORKS AND EDUCATED ON GOAL OF TREATMENT, FREQUENCY OF TREATMENT, ADVERSE RXN'S AND COMMON SIDE EFFECTS TO MONITOR FOR. INSTRUCTED PT TO ASSURE THAT ALL PROVIDERS INCLUDING DENTISTS ARE AWARE OF MEDICATION RECEIVED. DISCUSSED FLOW OF VISIT AND ORIENTED TO INFUSION CENTER. PT VERBALIZES UNDERSTANDING. CALL LIGHT PROVIDED AND PT AWARE TO ALERT STAFF OF ANY CONCERNS DURING TREATMENT.        Treatment Conditions & Drug Specific Questions:    Inclisiran (LEQVIO)     (Unless otherwise specified on patient specific therapy plan):     REMINDER:  Recommended Vitals/Observation:  Vitals: Obtain vital signs prior to injection and at end of observation period.    Observation: Patient may leave 30 minutes after the FIRST injection. Patient may leave immediately following injection for all subsequent doses    Lab Results   Component Value Date    CHOL 276 (H) 03/03/2025    CHOL 264 (H) 12/04/2023    CHOL 218 (H) 12/07/2022     Lab Results   Component Value Date    HDL 46 03/03/2025    HDL 47.0 12/04/2023    HDL 41.2 12/07/2022     Lab Results   Component Value Date    LDLCALC 176 (H) 03/03/2025    LDLCALC 191 (H) 12/04/2023     Lab Results   Component Value Date    TRIG 319 (H) 03/03/2025    TRIG 129 12/04/2023    TRIG 111 12/07/2022     No components found for: \"CHOLHDL\"         Weight Based Drug Calculations:    WEIGHT BASED DRUGS: NOT APPLICABLE / FLAT DOSE       Post Treatment: Patient tolerated treatment " without issue and was discharged in no apparent distress.      Note Authored / Patient Cared for By: Vicenta Love RN

## 2025-06-09 DIAGNOSIS — E78.2 MIXED HYPERLIPIDEMIA: ICD-10-CM

## 2025-06-24 ENCOUNTER — HOSPITAL ENCOUNTER (OUTPATIENT)
Dept: RADIOLOGY | Facility: CLINIC | Age: 55
Discharge: HOME | End: 2025-06-24
Payer: MEDICARE

## 2025-06-24 ENCOUNTER — OFFICE VISIT (OUTPATIENT)
Dept: ORTHOPEDIC SURGERY | Facility: CLINIC | Age: 55
End: 2025-06-24
Payer: MEDICARE

## 2025-06-24 DIAGNOSIS — M54.16 LUMBAR RADICULOPATHY: ICD-10-CM

## 2025-06-24 DIAGNOSIS — M54.16 LUMBAR RADICULOPATHY: Primary | ICD-10-CM

## 2025-06-24 PROCEDURE — 72100 X-RAY EXAM L-S SPINE 2/3 VWS: CPT | Performed by: ORTHOPAEDIC SURGERY

## 2025-06-24 PROCEDURE — 99214 OFFICE O/P EST MOD 30 MIN: CPT | Performed by: ORTHOPAEDIC SURGERY

## 2025-06-24 PROCEDURE — 4010F ACE/ARB THERAPY RXD/TAKEN: CPT | Performed by: ORTHOPAEDIC SURGERY

## 2025-06-24 PROCEDURE — 72100 X-RAY EXAM L-S SPINE 2/3 VWS: CPT

## 2025-06-24 PROCEDURE — 99212 OFFICE O/P EST SF 10 MIN: CPT | Performed by: ORTHOPAEDIC SURGERY

## 2025-06-24 NOTE — PROGRESS NOTES
Damián Gutiérrez is a 55 y.o. male who presents for Follow-up of the Lower Back (12/9/24 L2-L4 laminectomy with L3-4 TLIF with exploration of previous interspinous process device at L4-5/6 1/2 months out/X-rays today).    HPI:  55-year-old gentleman here for surgical follow-up.  He is 6-1/2 months out from an open L3-4 TLIF with exploration of interspinous device at L4-5.  He denies any fever chills nausea vomiting night sweats.  He has no bowel or bladder complaints.    Physical exam:  Well-nourished, well kept.No lymphangitis or lymphadenopathy in the examined extremities.  Gait normal.  Can stand on heels and toes.   Examination of the back shows minimal tenderness in the paraspinous musculature in the lumbosacral area a little bit more on the left.  There is no significant decreased range of motion in all directions due to guarding/muscle spasms and pain at extremes.  There is good strength and no instability.  Examination of the lower extremities reveals no point tenderness, swelling, or deformity.  Range of motion of the hips, knees, and ankles are full without crepitance, instability, or exacerbation of pain.  Strength is 5/5 throughout.  No redness, abrasions, or lesions on extremities  Gross sensation intact in the extremities.  Clonus negative.  Affect normal.  Alert and oriented ×3.  Coordination normal.    Imaging studies:  AP lateral plain films of the lumbar spine were ordered and reviewed today.    Assessment:  55-year-old gentleman here for surgical follow-up.  He is 6-1/2 months out from an open L3-4 TLIF with exploration of interspinous device at L4-5.  Overall he is doing very well, he thinks he is at least 98% better.  He still has a little bit of numbness and tingling in the left leg that comes and goes based on activity and a little bit of pain on the ball of his left foot.  He is back to work as a .  His incision is very well-healed.  His x-rays look good, there is not appear to be  any hardware failure or breakage or malposition.  He still has a little bit of pain across the lumbosacral area mostly on the left but it is significantly better than it was prior to surgery.    We have ordered and reviewed tests, x-rays.  Labs from March 3, 2025 were reviewed, glucose is 159.  This is a patient with 2 chronic stable problems, back pain, radicular leg and foot pain.    For complete plan and/or surgical details, please refer to Dr. Mclaughlin's portion of this split dictation.    -Dameon Woodson PA-C    In a face-to-face encounter, I performed a history and physical examination, discussed pertinent diagnostic studies if indicated, and discussed diagnosis and management strategies with both the patient and the midlevel provider.  I reviewed the midlevel's note and agree with the documented findings and plan of care.    6 months out L2-L4 midline laminectomy with a TLIF at L3-4.  Patient says he is 98% better.  He still does have some lower buttock pain and back pain as well as some numbness in his right great toe.  He is happy with how he is doing.  He has those 2 chronic stable problems.  We ordered and reviewed x-rays today and his hardware is in very good position looks like he is fused in nicely without any evidence of any instability.  Glucose level was 159 from March 2025.  He understands the importance of maintaining appropriate glucose levels for appropriate healing.  That also may contribute to his numbness in his foot.  We will see him back in 6 months for AP lateral x-rays of the lumbar spine.    Philip Mclaughlin MD  Orthopedic surgery

## 2025-07-14 ENCOUNTER — APPOINTMENT (OUTPATIENT)
Dept: INFUSION THERAPY | Facility: CLINIC | Age: 55
End: 2025-07-14
Payer: MEDICARE

## 2025-07-29 ENCOUNTER — APPOINTMENT (OUTPATIENT)
Dept: INFUSION THERAPY | Facility: CLINIC | Age: 55
End: 2025-07-29
Payer: MEDICARE

## 2025-07-29 VITALS
TEMPERATURE: 97.2 F | OXYGEN SATURATION: 99 % | DIASTOLIC BLOOD PRESSURE: 73 MMHG | RESPIRATION RATE: 18 BRPM | HEART RATE: 67 BPM | SYSTOLIC BLOOD PRESSURE: 132 MMHG

## 2025-07-29 DIAGNOSIS — E78.2 MIXED HYPERLIPIDEMIA: ICD-10-CM

## 2025-07-29 PROCEDURE — 96372 THER/PROPH/DIAG INJ SC/IM: CPT | Performed by: NURSE PRACTITIONER

## 2025-07-29 RX ORDER — ALBUTEROL SULFATE 0.83 MG/ML
3 SOLUTION RESPIRATORY (INHALATION) AS NEEDED
OUTPATIENT
Start: 2025-09-07

## 2025-07-29 RX ORDER — DIPHENHYDRAMINE HYDROCHLORIDE 50 MG/ML
50 INJECTION, SOLUTION INTRAMUSCULAR; INTRAVENOUS AS NEEDED
OUTPATIENT
Start: 2025-09-07

## 2025-07-29 RX ORDER — FAMOTIDINE 10 MG/ML
20 INJECTION, SOLUTION INTRAVENOUS ONCE AS NEEDED
OUTPATIENT
Start: 2025-09-07

## 2025-07-29 RX ORDER — EPINEPHRINE 0.3 MG/.3ML
0.3 INJECTION SUBCUTANEOUS EVERY 5 MIN PRN
OUTPATIENT
Start: 2025-09-07

## 2025-07-29 ASSESSMENT — ENCOUNTER SYMPTOMS
WHEEZING: 0
SHORTNESS OF BREATH: 0
EXTREMITY WEAKNESS: 0
PALPITATIONS: 0
LIGHT-HEADEDNESS: 0
DIZZINESS: 0
NUMBNESS: 0
LEG SWELLING: 0
COUGH: 0
WOUND: 0

## 2025-07-29 NOTE — PROGRESS NOTES
[facility]   Infusion Clinic Note   Date: 2025   Name: Damián Gutiérrez  : 1970   MRN: [id number]         Reason for Visit: Injections (284 mg leqvio injection)         Today: We administered inclisiran.       Referring Provider: Leland Arauz DO    Plan Provider: Leland Arauz DO      For a Diagnosis of: Mixed hyperlipidemia       At today's visit patient accompanied by: Self      Today's Vitals:   Vitals:    25 1453   BP: 132/73   Pulse: 67   Resp: 18   Temp: 36.2 °C (97.2 °F)   SpO2: 99%             Pre - Treatment Checklist:      - Previous reaction to current treatment: no      (Assess patient for the concerns below. Document provider notification as appropriate).  - Active or recent infection with/without current antibiotic use: no  - Recent or planned invasive dental work: no  - Recent or planned surgeries: no  - Recently received or plans to receive vaccinations: no  - Has treatment related toxicities: no  - Any chance may be pregnant:  no      Pain:6   - Is the pain different from normal: yes   - Is prescribing Doctor aware:  n/a      Labs: Reviewed       Fall Risk Screening:              Review Of Systems:  Review of Systems   Respiratory:  Negative for cough, shortness of breath and wheezing.    Cardiovascular:  Negative for chest pain, leg swelling and palpitations.   Skin:  Negative for itching, rash and wound.   Neurological:  Negative for dizziness, extremity weakness, light-headedness and numbness.         Infusion Readiness:  - Assessment Concerns Related to Infusion: No  - Provider notified: n/a      New Patient Education:    N/A (returning patient for continuation of therapy. Ongoing education provided as needed.)        Treatment Conditions & Drug Specific Questions:    Inclisiran (LEQVIO)     (Unless otherwise specified on patient specific therapy plan):     REMINDER:  Recommended Vitals/Observation:  Vitals: Obtain vital signs prior to  "injection and at end of observation period.    Observation: Patient may leave 30 minutes after the FIRST injection. Patient may leave immediately following injection for all subsequent doses    Lab Results   Component Value Date    CHOL 276 (H) 03/03/2025    CHOL 264 (H) 12/04/2023    CHOL 218 (H) 12/07/2022     Lab Results   Component Value Date    HDL 46 03/03/2025    HDL 47.0 12/04/2023    HDL 41.2 12/07/2022     Lab Results   Component Value Date    LDLCALC 176 (H) 03/03/2025    LDLCALC 191 (H) 12/04/2023     Lab Results   Component Value Date    TRIG 319 (H) 03/03/2025    TRIG 129 12/04/2023    TRIG 111 12/07/2022     No components found for: \"CHOLHDL\"         Weight Based Drug Calculations:    WEIGHT BASED DRUGS: NOT APPLICABLE / FLAT DOSE       Post Treatment: Patient tolerated treatment without issue and was discharged in no apparent distress.      Note Authored / Patient Cared for By: Kimberly Alexander RN        "

## 2025-07-29 NOTE — PATIENT INSTRUCTIONS
Today :We administered inclisiran.     For:   1. Mixed hyperlipidemia         Your next appointment is due in:  6 MONTHS        Please read the  Medication Guide that was given to you and reviewed during todays visit.     (Tell all doctors including dentists that you are taking this medication)     Go to the emergency room or call 911 if:  -You have signs of allergic reaction:   -Rash, hives, itching.   -Swollen, blistered, peeling skin.   -Swelling of face, lips, mouth, tongue or throat.   -Tightness of chest, trouble breathing, swallowing or talking     Call your doctor:  - If IV / injection site gets red, warm, swollen, itchy or leaks fluid or pus.     (Leave dressing on your IV site for at least 2 hours and keep area clean and dry  - If you get sick or have symptoms of infection or are not feeling well for any reason.    (Wash your hands often, stay away from people who are sick)  - If you have side effects from your medication that do not go away or are bothersome.     (Refer to the teaching your nurse gave you for side effects to call your doctor about)    - Common side effects may include:  stuffy nose, headache, feeling tired, muscle aches, upset stomach  - Before receiving any vaccines     - Call [facility] at   If:  - You get sick, are on antibiotics, have had a recent vaccine, have surgery or dental work and your doctor wants your visit rescheduled.  - You need to cancel and reschedule your visit for any reason. Call at least 2 days before your visit if you need to cancel.   - Your insurance changes before your next visit.    (We will need to get approval from your new insurance. This can take up to two weeks.)     The [facility] is opened Monday thru Friday. We are closed on weekends and holidays.   Voice mail will take your call if the center is closed. If you leave a message please allow 24 hours for a call back during weekdays. If you leave a message on a  weekend/holiday, we will call you back the next business day.    A pharmacist is available Monday - Friday from 8:30AM to 3:30PM to help answer any questions you may have about your prescriptions(s). Please call pharmacy at:    [facility]: [phone]  [facility]: [phone]  [facility]: [phone]

## 2026-01-29 ENCOUNTER — APPOINTMENT (OUTPATIENT)
Dept: INFUSION THERAPY | Facility: CLINIC | Age: 56
End: 2026-01-29
Payer: MEDICARE

## 2026-03-09 ENCOUNTER — APPOINTMENT (OUTPATIENT)
Dept: CARDIOLOGY | Facility: CLINIC | Age: 56
End: 2026-03-09
Payer: MEDICARE

## (undated) DEVICE — WAX, BONE, 2.5 GM

## (undated) DEVICE — TUBING, MANIFOLD, LOW PRESSURE

## (undated) DEVICE — SUTURE, STRATAFIX, 3-0, SPIRAL MONOCRYL PLUS, PS, 70CM, UNDYED

## (undated) DEVICE — Device

## (undated) DEVICE — SUTURE, VICRYL, 2-0, 18 IN CP-2, UNDYED

## (undated) DEVICE — SUTURE, STRATAFIX, 1 SYMMETRIC, PDS PLUS, 60CM, CTX, VIOLET

## (undated) DEVICE — SEALANT, HEMOSTATIC, FLOSEAL, 10 ML

## (undated) DEVICE — SEALER, BIPOLAR, AQUA MANTYS 6.0

## (undated) DEVICE — GOWN, SURGICAL, IMPLT, BACK, XLARGE, XLONG, STERILE

## (undated) DEVICE — GLOVE, SURGICAL, PROTEXIS PI BLUE W/NEUTHERA, 8.0, PF, LF

## (undated) DEVICE — ATS SUCTION LINE

## (undated) DEVICE — GAS, NITROUS OXIDE, E CYLINDER

## (undated) DEVICE — GLOVE, SURGICAL, PROTEXIS PI , 7.5, PF, LF

## (undated) DEVICE — DRAPE, SHEET, 17 X 23 IN

## (undated) DEVICE — DRESSING, TRANSPARENT, TEGADERM, 4 X 4-3/4 IN

## (undated) DEVICE — SET, AUTOTRANSFUSION, AT1

## (undated) DEVICE — CATHETER, OPTITORQUE, 5FR, JACKY, 3.5/ 2H/110CM, CURVED

## (undated) DEVICE — TOWEL PACK, STERILE, 4/PACK, BLUE

## (undated) DEVICE — DRILL, NEURO, PRECISION, 3MM X 3.8MM, CARBIDE

## (undated) DEVICE — ELECTRODE, ELECTROSURGICAL, BLADE EXT 4 INCH, INSULATED

## (undated) DEVICE — BLADE, BLUNT, 25MM, WITH TUBESET

## (undated) DEVICE — STAPLER, SKIN, PLUS, WIDE, 35

## (undated) DEVICE — BAND, VASCULAR, RADIAL HEMOSTAT, REGULAR 24CM

## (undated) DEVICE — APPLICATOR, CHLORAPREP, W/ORANGE TINT, 26ML

## (undated) DEVICE — GLOVE, SURGICAL, PROTEXIS PI MICRO, 7.5, PF, LF

## (undated) DEVICE — CONTAINER, SPECIMEN, 4 OZ, OR PEEL PACK, STERILE

## (undated) DEVICE — DRAPE, SHEET, XL

## (undated) DEVICE — DRAPE, MICROSCOPE, W/REMOVABLE LENS

## (undated) DEVICE — BUR, 5.0MM, ROUND, AGGRESSIVE, FLUTED, LF

## (undated) DEVICE — TUBING SET, HIGH PRESSURE, 3M, DISP

## (undated) DEVICE — SOLUTION, IRRIGATION, STERILE WATER, 1000 ML, POUR BOTTLE

## (undated) DEVICE — TAP, 5.5MM

## (undated) DEVICE — DRAPE, INCISE, ANTIMICROBIAL, IOBAN 2, STERI DRAPE, 23 X 33 IN, DISPOSABLE, STERILE

## (undated) DEVICE — SOLUTION, IRRIGATION, SODIUM CHLORIDE 0.9%, 1000 ML, POUR BOTTLE

## (undated) DEVICE — SHEATH, GLIDESHEATH, SLENDER, 6FR 10CM

## (undated) DEVICE — RESERVOIRE, ATR 120 COLLECTION

## (undated) DEVICE — CATHETER, COUDE, 2 WAY, 16FR 5CC, LF

## (undated) DEVICE — TIP, SUCTION, FRAZIER, W/CONTROL VENT, 12 FR

## (undated) DEVICE — COVER, TABLE, 54X90